# Patient Record
Sex: FEMALE | Race: WHITE | Employment: UNEMPLOYED | URBAN - METROPOLITAN AREA
[De-identification: names, ages, dates, MRNs, and addresses within clinical notes are randomized per-mention and may not be internally consistent; named-entity substitution may affect disease eponyms.]

---

## 2017-01-09 ENCOUNTER — OFFICE VISIT (OUTPATIENT)
Dept: OBGYN CLINIC | Facility: CLINIC | Age: 27
End: 2017-01-09

## 2017-01-09 VITALS
SYSTOLIC BLOOD PRESSURE: 112 MMHG | BODY MASS INDEX: 22.58 KG/M2 | HEIGHT: 59.25 IN | WEIGHT: 112 LBS | DIASTOLIC BLOOD PRESSURE: 62 MMHG | HEART RATE: 86 BPM

## 2017-01-09 DIAGNOSIS — Z12.4 CERVICAL CANCER SCREENING: ICD-10-CM

## 2017-01-09 DIAGNOSIS — Z34.81 PRENATAL CARE, SUBSEQUENT PREGNANCY, FIRST TRIMESTER: Primary | ICD-10-CM

## 2017-01-09 DIAGNOSIS — Z11.3 SCREENING FOR STD (SEXUALLY TRANSMITTED DISEASE): ICD-10-CM

## 2017-01-09 PROCEDURE — 87086 URINE CULTURE/COLONY COUNT: CPT | Performed by: OBSTETRICS & GYNECOLOGY

## 2017-01-09 PROCEDURE — 88175 CYTOPATH C/V AUTO FLUID REDO: CPT | Performed by: OBSTETRICS & GYNECOLOGY

## 2017-01-09 PROCEDURE — 87591 N.GONORRHOEAE DNA AMP PROB: CPT | Performed by: OBSTETRICS & GYNECOLOGY

## 2017-01-09 PROCEDURE — 87491 CHLMYD TRACH DNA AMP PROBE: CPT | Performed by: OBSTETRICS & GYNECOLOGY

## 2017-01-09 PROCEDURE — 0500F INITIAL PRENATAL CARE VISIT: CPT | Performed by: OBSTETRICS & GYNECOLOGY

## 2017-01-09 NOTE — PROGRESS NOTES
Here for initial prenatal visit with our group. 27year old  at 11w0d by LMP c/w todays US. OB Hx: 1   PMH: cone dystrophy, inherited disorder  PSxH: hernia repair    Patient's last menstrual period was 10/24/2016 (exact date). .     Last pap s

## 2017-01-10 LAB
C TRACH DNA SPEC QL NAA+PROBE: NEGATIVE
N GONORRHOEA DNA SPEC QL NAA+PROBE: NEGATIVE

## 2017-01-12 ENCOUNTER — TELEPHONE (OUTPATIENT)
Dept: OBGYN CLINIC | Facility: CLINIC | Age: 27
End: 2017-01-12

## 2017-01-12 NOTE — TELEPHONE ENCOUNTER
Pt would like [id number] Testing. Advised pt to call [employer] and verify benefits and out of pocket costs; number given. Advised pt to call back if still desires testing and we will prepare [employer] form for . Patient verbalized understanding.

## 2017-01-30 ENCOUNTER — HOSPITAL ENCOUNTER (EMERGENCY)
Facility: HOSPITAL | Age: 27
Discharge: HOME | End: 2017-01-30
Attending: EMERGENCY MEDICINE
Payer: MEDICAID

## 2017-01-30 VITALS
DIASTOLIC BLOOD PRESSURE: 53 MMHG | TEMPERATURE: 98 F | HEIGHT: 60 IN | BODY MASS INDEX: 22.19 KG/M2 | RESPIRATION RATE: 18 BRPM | OXYGEN SATURATION: 100 % | SYSTOLIC BLOOD PRESSURE: 97 MMHG | WEIGHT: 113 LBS | HEART RATE: 89 BPM

## 2017-01-30 DIAGNOSIS — T74.21XA SEXUAL ASSAULT OF ADULT, INITIAL ENCOUNTER: Primary | ICD-10-CM

## 2017-01-30 PROCEDURE — 99285 EMERGENCY DEPT VISIT HI MDM: CPT

## 2017-01-30 NOTE — ED NOTES
[place] police dept notified of collection kit, sealed and ready for . Spoke with Sharon Ferguson, from department, officer to be dispatched for .

## 2017-01-30 NOTE — ED NOTES
Introductions made, poc discussed. Police officers Celestino guzmán # [id number] and Cheng Fermin #[id number] at bedside with pt. Questions answered and support given.
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
## 2017-02-01 ENCOUNTER — LAB ENCOUNTER (OUTPATIENT)
Dept: LAB | Age: 27
End: 2017-02-01
Attending: OBSTETRICS & GYNECOLOGY
Payer: MEDICAID

## 2017-02-01 DIAGNOSIS — Z34.81 PRENATAL CARE, SUBSEQUENT PREGNANCY, FIRST TRIMESTER: ICD-10-CM

## 2017-02-01 LAB
ANTIBODY SCREEN: NEGATIVE
BASOPHILS # BLD AUTO: 0.04 X10(3) UL (ref 0–0.1)
BASOPHILS NFR BLD AUTO: 0.6 %
EOSINOPHIL # BLD AUTO: 0.13 X10(3) UL (ref 0–0.3)
EOSINOPHIL NFR BLD AUTO: 1.9 %
ERYTHROCYTE [DISTWIDTH] IN BLOOD BY AUTOMATED COUNT: 13.2 % (ref 11.5–16)
HBV SURFACE AG SERPL QL IA: NONREACTIVE
HCT VFR BLD AUTO: 33.3 % (ref 34–50)
HGB BLD-MCNC: 11.9 G/DL (ref 12–16)
IMMATURE GRANULOCYTE COUNT: 0.02 X10(3) UL (ref 0–1)
IMMATURE GRANULOCYTE RATIO %: 0.3 %
LYMPHOCYTES # BLD AUTO: 1.74 X10(3) UL (ref 0.9–4)
LYMPHOCYTES NFR BLD AUTO: 25.7 %
MCH RBC QN AUTO: 31.6 PG (ref 27–33.2)
MCHC RBC AUTO-ENTMCNC: 35.7 G/DL (ref 31–37)
MCV RBC AUTO: 88.3 FL (ref 81–100)
MONOCYTES # BLD AUTO: 0.39 X10(3) UL (ref 0.1–0.6)
MONOCYTES NFR BLD AUTO: 5.8 %
NEUTROPHIL ABS PRELIM: 4.44 X10 (3) UL (ref 1.3–6.7)
NEUTROPHILS # BLD AUTO: 4.44 X10(3) UL (ref 1.3–6.7)
NEUTROPHILS NFR BLD AUTO: 65.7 %
PLATELET # BLD AUTO: 219 10(3)UL (ref 150–450)
RBC # BLD AUTO: 3.77 X10(6)UL (ref 3.8–5.1)
RED CELL DISTRIBUTION WIDTH-SD: 41.8 FL (ref 35.1–46.3)
RH BLOOD TYPE: POSITIVE
RUBV IGG SER QL: POSITIVE
WBC # BLD AUTO: 6.8 X10(3) UL (ref 4–13)

## 2017-02-01 PROCEDURE — 87591 N.GONORRHOEAE DNA AMP PROB: CPT | Performed by: NURSE PRACTITIONER

## 2017-02-01 PROCEDURE — 36415 COLL VENOUS BLD VENIPUNCTURE: CPT

## 2017-02-01 PROCEDURE — 86901 BLOOD TYPING SEROLOGIC RH(D): CPT

## 2017-02-01 PROCEDURE — 86592 SYPHILIS TEST NON-TREP QUAL: CPT

## 2017-02-01 PROCEDURE — 87491 CHLMYD TRACH DNA AMP PROBE: CPT | Performed by: NURSE PRACTITIONER

## 2017-02-01 PROCEDURE — 87340 HEPATITIS B SURFACE AG IA: CPT

## 2017-02-01 PROCEDURE — 85025 COMPLETE CBC W/AUTO DIFF WBC: CPT

## 2017-02-01 PROCEDURE — 87389 HIV-1 AG W/HIV-1&-2 AB AG IA: CPT

## 2017-02-01 PROCEDURE — 86762 RUBELLA ANTIBODY: CPT

## 2017-02-01 PROCEDURE — 86900 BLOOD TYPING SEROLOGIC ABO: CPT

## 2017-02-01 PROCEDURE — 86850 RBC ANTIBODY SCREEN: CPT
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
## 2017-02-02 ENCOUNTER — TELEPHONE (OUTPATIENT)
Dept: OBGYN CLINIC | Facility: CLINIC | Age: 27
End: 2017-02-02

## 2017-02-02 LAB — RPR SER QL: NONREACTIVE

## 2017-02-02 RX ORDER — AZITHROMYCIN 500 MG/1
TABLET, FILM COATED ORAL
Qty: 2 TABLET | Refills: 0 | Status: SHIPPED | OUTPATIENT
Start: 2017-02-02 | End: 2017-02-06

## 2017-02-02 NOTE — TELEPHONE ENCOUNTER
Patient stated she spoke with you earlier and now she has a couple of more questions. Please call her.

## 2017-02-02 NOTE — PROGRESS NOTES
Quick Note:    Spoke with patient and notified of results.  Advised patient we will:  1.) RX Azithromycin 500 mg 2 tablets by mouth X 1 dose. (this has been done)  2.) Report to health department  3.) Safe sex practices, will do DEAN at [facility]- how

## 2017-02-20 ENCOUNTER — TELEPHONE (OUTPATIENT)
Dept: OBGYN CLINIC | Facility: CLINIC | Age: 27
End: 2017-02-20

## 2017-02-27 ENCOUNTER — OFFICE VISIT (OUTPATIENT)
Dept: OBGYN CLINIC | Facility: CLINIC | Age: 27
End: 2017-02-27

## 2017-02-27 ENCOUNTER — APPOINTMENT (OUTPATIENT)
Dept: LAB | Age: 27
End: 2017-02-27
Attending: NURSE PRACTITIONER
Payer: MEDICAID

## 2017-02-27 VITALS
WEIGHT: 117 LBS | HEIGHT: 59.25 IN | DIASTOLIC BLOOD PRESSURE: 56 MMHG | SYSTOLIC BLOOD PRESSURE: 92 MMHG | BODY MASS INDEX: 23.59 KG/M2

## 2017-02-27 DIAGNOSIS — Z86.19 HISTORY OF CHLAMYDIA: ICD-10-CM

## 2017-02-27 DIAGNOSIS — Z34.82 PRENATAL CARE, SUBSEQUENT PREGNANCY IN SECOND TRIMESTER: ICD-10-CM

## 2017-02-27 DIAGNOSIS — Z34.82 PRENATAL CARE, SUBSEQUENT PREGNANCY IN SECOND TRIMESTER: Primary | ICD-10-CM

## 2017-02-27 PROCEDURE — 36415 COLL VENOUS BLD VENIPUNCTURE: CPT

## 2017-02-27 PROCEDURE — 87491 CHLMYD TRACH DNA AMP PROBE: CPT | Performed by: NURSE PRACTITIONER

## 2017-02-27 PROCEDURE — 81511 FTL CGEN ABNOR FOUR ANAL: CPT

## 2017-02-27 PROCEDURE — 0502F SUBSEQUENT PRENATAL CARE: CPT | Performed by: NURSE PRACTITIONER

## 2017-02-27 PROCEDURE — 87591 N.GONORRHOEAE DNA AMP PROB: CPT | Performed by: NURSE PRACTITIONER

## 2017-02-27 NOTE — PROGRESS NOTES
JARET  Feeling flutters  GC/CHL positive previously, test of cure done today  Doing well  No complaints.  No LOF/VB/uctx  RH positive  Genetic testing- quad screen ordered  Anatomy Scan scheduled 2 weeks  JARET 4 weeks

## 2017-02-28 LAB
C TRACH DNA SPEC QL NAA+PROBE: NEGATIVE
N GONORRHOEA DNA SPEC QL NAA+PROBE: NEGATIVE

## 2017-03-02 LAB
FAMILY HISTORY OF ANEUPLOIDY: NO
FAMILY HX NEURAL TUBE DEFECT: NO
GESTATIONAL AGE (EXACT): 18 WEEKS
INSULIN REQ MATERNAL DIABETES: NO
MATERNAL AGE OF DELIVERY: 28.8 YR
MATERNAL SCREEN INTERPRETATION: NORMAL
MATERNAL WEIGHT: 117 LBS
MOM FOR AFP: 0.64
MOM FOR DIA: 0.87
MOM FOR HCG: 0.47
MOM FOR UE3: 1.21
PATIENT'S AFP: 32 NG/ML
PATIENT'S DIA: 152 PG/ML
PATIENT'S HCG: NORMAL IU/L
PATIENT'S UE3: 1.88 NG/ML

## 2017-03-13 ENCOUNTER — OFFICE VISIT (OUTPATIENT)
Dept: OBGYN CLINIC | Facility: CLINIC | Age: 27
End: 2017-03-13

## 2017-03-13 ENCOUNTER — APPOINTMENT (OUTPATIENT)
Dept: OBGYN CLINIC | Facility: CLINIC | Age: 27
End: 2017-03-13

## 2017-03-13 VITALS
HEIGHT: 59.25 IN | BODY MASS INDEX: 23.99 KG/M2 | DIASTOLIC BLOOD PRESSURE: 56 MMHG | WEIGHT: 119 LBS | SYSTOLIC BLOOD PRESSURE: 94 MMHG

## 2017-03-13 DIAGNOSIS — Z36.89 ENCOUNTER FOR FETAL ANATOMIC SURVEY: ICD-10-CM

## 2017-03-13 DIAGNOSIS — Z34.82 PRENATAL CARE, SUBSEQUENT PREGNANCY IN SECOND TRIMESTER: Primary | ICD-10-CM

## 2017-03-13 PROCEDURE — 76805 OB US >/= 14 WKS SNGL FETUS: CPT | Performed by: OBSTETRICS & GYNECOLOGY

## 2017-03-13 PROCEDURE — 0502F SUBSEQUENT PRENATAL CARE: CPT | Performed by: OBSTETRICS & GYNECOLOGY

## 2017-03-13 NOTE — PROGRESS NOTES
JARET  Doing well  RH pos  S/P Anatomy scan wnl, low lying placenta repeat scan at 28 wks  1 hr glucose (24-28wks)  TDAP recommended during pregnancy and instructions given  RTC q4wks

## 2017-03-13 NOTE — PATIENT INSTRUCTIONS
SCREENING FOR GESTATIONAL DIABETES    Pregnancy is a stress to your body. One way in which the stress may manifest itself is through an abnormality in sugar metabolism. In non-pregnant patients, this abnormality (a lack of insulin) is known as diabetes. is normal and no other risk factors exist, no further testing is necessary.   If the blood glucose is abnormal, this indicates an increased risk of gestational diabetes and will require a formal three hour glucose tolerance test.  This test will be done wit

## 2017-04-10 ENCOUNTER — OFFICE VISIT (OUTPATIENT)
Dept: OBGYN CLINIC | Facility: CLINIC | Age: 27
End: 2017-04-10

## 2017-04-10 ENCOUNTER — LAB ENCOUNTER (OUTPATIENT)
Dept: LAB | Age: 27
End: 2017-04-10
Attending: OBSTETRICS & GYNECOLOGY
Payer: MEDICAID

## 2017-04-10 VITALS
WEIGHT: 128 LBS | SYSTOLIC BLOOD PRESSURE: 96 MMHG | HEIGHT: 59.25 IN | DIASTOLIC BLOOD PRESSURE: 58 MMHG | BODY MASS INDEX: 25.8 KG/M2

## 2017-04-10 DIAGNOSIS — Z34.82 PRENATAL CARE, SUBSEQUENT PREGNANCY IN SECOND TRIMESTER: ICD-10-CM

## 2017-04-10 DIAGNOSIS — Z34.82 PRENATAL CARE, SUBSEQUENT PREGNANCY IN SECOND TRIMESTER: Primary | ICD-10-CM

## 2017-04-10 PROCEDURE — 36415 COLL VENOUS BLD VENIPUNCTURE: CPT

## 2017-04-10 PROCEDURE — 0502F SUBSEQUENT PRENATAL CARE: CPT | Performed by: OBSTETRICS & GYNECOLOGY

## 2017-04-10 PROCEDURE — 82950 GLUCOSE TEST: CPT

## 2017-04-10 PROCEDURE — 85025 COMPLETE CBC W/AUTO DIFF WBC: CPT

## 2017-05-11 ENCOUNTER — OFFICE VISIT (OUTPATIENT)
Dept: OBGYN CLINIC | Facility: CLINIC | Age: 27
End: 2017-05-11

## 2017-05-11 VITALS
BODY MASS INDEX: 27.42 KG/M2 | SYSTOLIC BLOOD PRESSURE: 102 MMHG | WEIGHT: 136 LBS | HEIGHT: 59.25 IN | DIASTOLIC BLOOD PRESSURE: 60 MMHG

## 2017-05-11 DIAGNOSIS — Z3A.28 28 WEEKS GESTATION OF PREGNANCY: Primary | ICD-10-CM

## 2017-05-11 DIAGNOSIS — Z34.83 ENCOUNTER FOR SUPERVISION OF NORMAL PREGNANCY IN MULTIGRAVIDA IN THIRD TRIMESTER: ICD-10-CM

## 2017-05-11 PROCEDURE — 0502F SUBSEQUENT PRENATAL CARE: CPT | Performed by: OBSTETRICS & GYNECOLOGY

## 2017-05-11 NOTE — PROGRESS NOTES
JARET  Doing well  RH +  S/P Anatomy scan needs f/u next visit for placenta location  1 hr glucose (24-28wks)normal  TDAP recommended during pregnancy and instructions given, to consider for next visit, thinks had it right at beginning of pregnancy for shelia

## 2017-05-11 NOTE — PATIENT INSTRUCTIONS
Tdap Vaccine: What You Need To Know    1. Why Get Vaccinated:    · Tetanus, diphtheria, and pertussis can be very serious diseases, even for adolescents and adults. Tdap vaccine can protect us from these diseases.     · Tetanus (Lockjaw) causes painful mu tetanus infection.

## 2017-05-12 ENCOUNTER — TELEPHONE (OUTPATIENT)
Dept: OBGYN CLINIC | Facility: CLINIC | Age: 27
End: 2017-05-12

## 2017-05-12 NOTE — TELEPHONE ENCOUNTER
Patient was last seen 05/11/17. She was advised to f/u in 2 weeks for anatomy scan & placenta location. She is requesting to come in about 5 days sooner. She states her parents are moving to [facility] in 1 week and she needs them to drive her.   Please adv

## 2017-05-17 ENCOUNTER — APPOINTMENT (OUTPATIENT)
Dept: OBGYN CLINIC | Facility: CLINIC | Age: 27
End: 2017-05-17

## 2017-05-17 ENCOUNTER — OFFICE VISIT (OUTPATIENT)
Dept: OBGYN CLINIC | Facility: CLINIC | Age: 27
End: 2017-05-17

## 2017-05-17 VITALS
WEIGHT: 135 LBS | HEIGHT: 59.2 IN | DIASTOLIC BLOOD PRESSURE: 56 MMHG | BODY MASS INDEX: 27.21 KG/M2 | SYSTOLIC BLOOD PRESSURE: 100 MMHG

## 2017-05-17 DIAGNOSIS — O44.02 PLACENTA PREVIA WITHOUT HEMORRHAGE, ANTEPARTUM, SECOND TRIMESTER: ICD-10-CM

## 2017-05-17 DIAGNOSIS — Z34.83 ENCOUNTER FOR SUPERVISION OF NORMAL PREGNANCY IN MULTIGRAVIDA IN THIRD TRIMESTER: Primary | ICD-10-CM

## 2017-05-17 PROCEDURE — 76816 OB US FOLLOW-UP PER FETUS: CPT | Performed by: OBSTETRICS & GYNECOLOGY

## 2017-05-17 PROCEDURE — 0502F SUBSEQUENT PRENATAL CARE: CPT | Performed by: OBSTETRICS & GYNECOLOGY

## 2017-05-17 NOTE — PROGRESS NOTES
Quick Note:    Here for third trimester ultrasound. Mid trimester ultrasound  with marginal to low lying placental position.  AUA EGA 29w 4d  giving ultrasound BEE of July 29, 2017 - normal interval growth.    Posterior placenta without previa and not low l

## 2017-05-17 NOTE — PROGRESS NOTES
Having issue with sciatica. Had before pregnancy seeing chiropractor and acupuncturist. Exercises and yoga moves discussed. Ultrasound with normal growth, normal placental location, and slight increase in AFV at 25.5 cm.  Discussed Tdap - not sure if will a

## 2017-05-31 ENCOUNTER — OFFICE VISIT (OUTPATIENT)
Dept: OBGYN CLINIC | Facility: CLINIC | Age: 27
End: 2017-05-31

## 2017-05-31 VITALS
SYSTOLIC BLOOD PRESSURE: 104 MMHG | BODY MASS INDEX: 27.82 KG/M2 | HEIGHT: 59.2 IN | DIASTOLIC BLOOD PRESSURE: 60 MMHG | WEIGHT: 138 LBS

## 2017-05-31 DIAGNOSIS — Z34.83 ENCOUNTER FOR SUPERVISION OF NORMAL PREGNANCY IN MULTIGRAVIDA IN THIRD TRIMESTER: Primary | ICD-10-CM

## 2017-05-31 PROCEDURE — 0502F SUBSEQUENT PRENATAL CARE: CPT | Performed by: NURSE PRACTITIONER

## 2017-06-13 ENCOUNTER — OFFICE VISIT (OUTPATIENT)
Dept: OBGYN CLINIC | Facility: CLINIC | Age: 27
End: 2017-06-13

## 2017-06-13 VITALS
SYSTOLIC BLOOD PRESSURE: 98 MMHG | WEIGHT: 140 LBS | DIASTOLIC BLOOD PRESSURE: 52 MMHG | BODY MASS INDEX: 28.22 KG/M2 | HEIGHT: 59.2 IN

## 2017-06-13 DIAGNOSIS — Z34.83 ENCOUNTER FOR SUPERVISION OF NORMAL PREGNANCY IN MULTIGRAVIDA IN THIRD TRIMESTER: Primary | ICD-10-CM

## 2017-06-13 DIAGNOSIS — N89.8 VAGINAL DISCHARGE: ICD-10-CM

## 2017-06-13 PROCEDURE — 0502F SUBSEQUENT PRENATAL CARE: CPT | Performed by: OBSTETRICS & GYNECOLOGY

## 2017-06-13 PROCEDURE — 87660 TRICHOMONAS VAGIN DIR PROBE: CPT | Performed by: OBSTETRICS & GYNECOLOGY

## 2017-06-13 PROCEDURE — 87480 CANDIDA DNA DIR PROBE: CPT | Performed by: OBSTETRICS & GYNECOLOGY

## 2017-06-13 PROCEDURE — 87510 GARDNER VAG DNA DIR PROBE: CPT | Performed by: OBSTETRICS & GYNECOLOGY

## 2017-06-13 NOTE — PROGRESS NOTES
JARET  Doing well, +FM  Denies VB/LOF/uctx  H/o  x 1  RTC in 2 wks   Declined TDAP  Fetal movement instructions given   Notes discharge, per pt for 2 weeks, watery  SSE neg ferning, nitrizine, pooling.    Vaginitis and gc chl collected

## 2017-06-15 NOTE — PROGRESS NOTES
Quick Note:    Patient informed of results. Verbalized understanding. She stated that she and her daughter were around cousin w/ \"sixth\" disease. She nor baby have any symptoms. She will contact us should she develop any symptoms.   ______

## 2017-06-26 ENCOUNTER — OFFICE VISIT (OUTPATIENT)
Dept: OBGYN CLINIC | Facility: CLINIC | Age: 27
End: 2017-06-26

## 2017-06-26 VITALS
BODY MASS INDEX: 28.22 KG/M2 | DIASTOLIC BLOOD PRESSURE: 60 MMHG | WEIGHT: 140 LBS | HEIGHT: 59.2 IN | SYSTOLIC BLOOD PRESSURE: 100 MMHG

## 2017-06-26 DIAGNOSIS — Z34.83 ENCOUNTER FOR SUPERVISION OF NORMAL PREGNANCY IN MULTIGRAVIDA IN THIRD TRIMESTER: Primary | ICD-10-CM

## 2017-06-26 PROCEDURE — 0502F SUBSEQUENT PRENATAL CARE: CPT | Performed by: NURSE PRACTITIONER

## 2017-06-26 PROCEDURE — 87081 CULTURE SCREEN ONLY: CPT | Performed by: NURSE PRACTITIONER

## 2017-06-26 NOTE — PROGRESS NOTES
JARET  Doing well, +FM  Denies VB/LOF- occasional contractions  Mode of delivery:  anticipated  Labor precautions discussed  SVE cl/ long/ -2  GBS collected   RTC 1 week

## 2017-07-03 ENCOUNTER — OFFICE VISIT (OUTPATIENT)
Dept: OBGYN CLINIC | Facility: CLINIC | Age: 27
End: 2017-07-03

## 2017-07-03 VITALS
SYSTOLIC BLOOD PRESSURE: 94 MMHG | WEIGHT: 140 LBS | DIASTOLIC BLOOD PRESSURE: 58 MMHG | HEIGHT: 59.2 IN | BODY MASS INDEX: 28.22 KG/M2

## 2017-07-03 DIAGNOSIS — Z34.83 ENCOUNTER FOR SUPERVISION OF NORMAL PREGNANCY IN MULTIGRAVIDA IN THIRD TRIMESTER: Primary | ICD-10-CM

## 2017-07-03 PROCEDURE — 0502F SUBSEQUENT PRENATAL CARE: CPT | Performed by: NURSE PRACTITIONER

## 2017-07-03 NOTE — PROGRESS NOTES
JARET  Doing well, +FM  Denies VB/LOF/uctx  Mode of delivery:  anticipated  Labor precautions discussed  SVE deferred  GBS negative/ prior child was positive  RTC 1 week

## 2017-07-07 ENCOUNTER — TELEPHONE (OUTPATIENT)
Dept: OBGYN CLINIC | Facility: CLINIC | Age: 27
End: 2017-07-07

## 2017-07-07 NOTE — TELEPHONE ENCOUNTER
Patient states she is having a lot of vaginal itching. Also c/o of vaginal swelling as well. Denies any odor to discharge. States normal pregnancy discharge. She has had symptoms on and off for about a week.   She states she has a h/o of yeast infection

## 2017-07-07 NOTE — TELEPHONE ENCOUNTER
Pt stating she has symptoms similar to yeast infection but it is much worse. Pt unsure if yeast infection. Requesting nurse call back.

## 2017-07-10 ENCOUNTER — OFFICE VISIT (OUTPATIENT)
Dept: OBGYN CLINIC | Facility: CLINIC | Age: 27
End: 2017-07-10

## 2017-07-10 VITALS
HEIGHT: 59.2 IN | SYSTOLIC BLOOD PRESSURE: 112 MMHG | DIASTOLIC BLOOD PRESSURE: 74 MMHG | WEIGHT: 141 LBS | BODY MASS INDEX: 28.43 KG/M2

## 2017-07-10 DIAGNOSIS — Z34.83 ENCOUNTER FOR SUPERVISION OF NORMAL PREGNANCY IN MULTIGRAVIDA IN THIRD TRIMESTER: Primary | ICD-10-CM

## 2017-07-10 DIAGNOSIS — B37.3 YEAST VAGINITIS: ICD-10-CM

## 2017-07-10 PROCEDURE — 87210 SMEAR WET MOUNT SALINE/INK: CPT | Performed by: OBSTETRICS & GYNECOLOGY

## 2017-07-10 PROCEDURE — 0502F SUBSEQUENT PRENATAL CARE: CPT | Performed by: OBSTETRICS & GYNECOLOGY

## 2017-07-10 RX ORDER — CLOTRIMAZOLE AND BETAMETHASONE DIPROPIONATE 10; .64 MG/G; MG/G
CREAM TOPICAL
Qty: 15 G | Refills: 0 | Status: SHIPPED | OUTPATIENT
Start: 2017-07-10 | End: 2017-07-10 | Stop reason: SDUPTHER

## 2017-07-10 RX ORDER — FLUCONAZOLE 150 MG/1
150 TABLET ORAL ONCE
Qty: 1 TABLET | Refills: 0 | Status: SHIPPED | OUTPATIENT
Start: 2017-07-10 | End: 2017-07-10 | Stop reason: SDUPTHER

## 2017-07-10 RX ORDER — CLOTRIMAZOLE AND BETAMETHASONE DIPROPIONATE 10; .64 MG/G; MG/G
CREAM TOPICAL
Qty: 45 G | Refills: 0 | Status: SHIPPED | OUTPATIENT
Start: 2017-07-10 | End: 2017-07-31

## 2017-07-10 RX ORDER — FLUCONAZOLE 150 MG/1
150 TABLET ORAL ONCE
Qty: 1 TABLET | Refills: 0 | Status: SHIPPED | OUTPATIENT
Start: 2017-07-10 | End: 2017-07-10

## 2017-07-11 NOTE — PROGRESS NOTES
Complains of severe vulvar pruritis and burning for one week. Rare contraction. Good FM. Mild erythema to upper genital area. Wet richie with hyphae. Lotrisone cream bid externally and Diflucan 150 mg orally x 1. See in one week.

## 2017-07-17 ENCOUNTER — OFFICE VISIT (OUTPATIENT)
Dept: OBGYN CLINIC | Facility: CLINIC | Age: 27
End: 2017-07-17

## 2017-07-17 VITALS
DIASTOLIC BLOOD PRESSURE: 60 MMHG | SYSTOLIC BLOOD PRESSURE: 100 MMHG | HEIGHT: 59.2 IN | BODY MASS INDEX: 27.82 KG/M2 | WEIGHT: 138 LBS

## 2017-07-17 DIAGNOSIS — Z34.83 ENCOUNTER FOR SUPERVISION OF NORMAL PREGNANCY IN MULTIGRAVIDA IN THIRD TRIMESTER: Primary | ICD-10-CM

## 2017-07-17 PROCEDURE — 0502F SUBSEQUENT PRENATAL CARE: CPT | Performed by: NURSE PRACTITIONER

## 2017-07-17 NOTE — PROGRESS NOTES
JARET  Doing well, +FM  Denies VB/LOF/uctx  Mode of delivery:  anticipated  Labor precautions discussed  SVE above  RTC 1 week

## 2017-07-24 ENCOUNTER — TELEPHONE (OUTPATIENT)
Dept: OBGYN CLINIC | Facility: CLINIC | Age: 27
End: 2017-07-24

## 2017-07-24 ENCOUNTER — OFFICE VISIT (OUTPATIENT)
Dept: OBGYN CLINIC | Facility: CLINIC | Age: 27
End: 2017-07-24

## 2017-07-24 VITALS
DIASTOLIC BLOOD PRESSURE: 62 MMHG | HEIGHT: 59.2 IN | SYSTOLIC BLOOD PRESSURE: 112 MMHG | WEIGHT: 140 LBS | BODY MASS INDEX: 28.22 KG/M2

## 2017-07-24 DIAGNOSIS — Z34.83 ENCOUNTER FOR SUPERVISION OF NORMAL PREGNANCY IN MULTIGRAVIDA IN THIRD TRIMESTER: Primary | ICD-10-CM

## 2017-07-24 PROCEDURE — 0502F SUBSEQUENT PRENATAL CARE: CPT | Performed by: NURSE PRACTITIONER

## 2017-07-24 NOTE — TELEPHONE ENCOUNTER
Advise patient to take a Benadryl just in case she was in contact with an allergen. Otherwise to ER with recurrent symptoms, tongue swelling, shortness of breath, or any new symptoms to call.

## 2017-07-24 NOTE — PROGRESS NOTES
JARET  Doing well, +FM  Denies VB/LOF/uctx  Mode of delivery:  anticipated  Labor precautions discussed  SVE unchanged  RTC 1 week with NST

## 2017-07-24 NOTE — TELEPHONE ENCOUNTER
Patient was just seen at [phone] for JARET whitaker. She stated she went to eat lunch at [facility] with her mom and had chicken sandwich + fries. About 5 minutes after she had a \"numb\" mouth and tongue. Denies any other symptoms. Denies any new foods.  States t

## 2017-07-24 NOTE — TELEPHONE ENCOUNTER
After pt left office visit today she went to cafeteria to eat  Pt noted that after she ate her mouth and tung was numb   Pt wanted to know if she should worry

## 2017-07-31 ENCOUNTER — APPOINTMENT (OUTPATIENT)
Dept: OBGYN CLINIC | Facility: CLINIC | Age: 27
End: 2017-07-31

## 2017-07-31 ENCOUNTER — OFFICE VISIT (OUTPATIENT)
Dept: OBGYN CLINIC | Facility: CLINIC | Age: 27
End: 2017-07-31

## 2017-07-31 ENCOUNTER — TELEPHONE (OUTPATIENT)
Dept: OBGYN CLINIC | Facility: CLINIC | Age: 27
End: 2017-07-31

## 2017-07-31 VITALS
SYSTOLIC BLOOD PRESSURE: 98 MMHG | BODY MASS INDEX: 28.25 KG/M2 | HEIGHT: 59.5 IN | DIASTOLIC BLOOD PRESSURE: 54 MMHG | WEIGHT: 142 LBS

## 2017-07-31 DIAGNOSIS — Z34.83 ENCOUNTER FOR SUPERVISION OF NORMAL PREGNANCY IN MULTIGRAVIDA IN THIRD TRIMESTER: Primary | ICD-10-CM

## 2017-07-31 DIAGNOSIS — O48.0 POST-TERM PREGNANCY, 40-42 WEEKS OF GESTATION: ICD-10-CM

## 2017-07-31 PROCEDURE — 59025 FETAL NON-STRESS TEST: CPT | Performed by: OBSTETRICS & GYNECOLOGY

## 2017-07-31 PROCEDURE — 0502F SUBSEQUENT PRENATAL CARE: CPT | Performed by: OBSTETRICS & GYNECOLOGY
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
## 2017-08-03 ENCOUNTER — OFFICE VISIT (OUTPATIENT)
Dept: OBGYN CLINIC | Facility: CLINIC | Age: 27
End: 2017-08-03

## 2017-08-03 VITALS
DIASTOLIC BLOOD PRESSURE: 60 MMHG | SYSTOLIC BLOOD PRESSURE: 104 MMHG | WEIGHT: 143 LBS | HEIGHT: 59.2 IN | BODY MASS INDEX: 28.83 KG/M2

## 2017-08-03 DIAGNOSIS — O48.0 POST-TERM PREGNANCY, 40-42 WEEKS OF GESTATION: Primary | ICD-10-CM

## 2017-08-03 DIAGNOSIS — Z34.83 ENCOUNTER FOR SUPERVISION OF NORMAL PREGNANCY IN MULTIGRAVIDA IN THIRD TRIMESTER: ICD-10-CM

## 2017-08-03 PROCEDURE — 0502F SUBSEQUENT PRENATAL CARE: CPT | Performed by: NURSE PRACTITIONER

## 2017-08-03 PROCEDURE — 59025 FETAL NON-STRESS TEST: CPT | Performed by: NURSE PRACTITIONER

## 2017-08-03 NOTE — PROGRESS NOTES
JARET  Doing well, +FM  Denies VB/LOF/uctx  Mode of delivery:  anticipated  Labor precautions discussed  SVE unchanged  Patient refuses IOL at this time  RTC 3 days with NST/ TAVO  NST reactive

## 2017-08-05 ENCOUNTER — TELEPHONE (OUTPATIENT)
Dept: OBGYN CLINIC | Facility: CLINIC | Age: 27
End: 2017-08-05

## 2017-08-05 NOTE — TELEPHONE ENCOUNTER
Pt is having a lot of hemorroidal pain due to diarrhea  She has tried a lot of home remedies  Fears when pushing this will become worse

## 2017-08-06 ENCOUNTER — HOSPITAL ENCOUNTER (INPATIENT)
Facility: HOSPITAL | Age: 27
LOS: 2 days | Discharge: HOME | End: 2017-08-08
Attending: OBSTETRICS & GYNECOLOGY | Admitting: OBSTETRICS & GYNECOLOGY
Payer: COMMERCIAL

## 2017-08-06 PROBLEM — Z34.90 PREGNANCY: Status: ACTIVE | Noted: 2017-08-06

## 2017-08-06 PROBLEM — Z34.90 PREGNANCY (HCC): Status: ACTIVE | Noted: 2017-08-06

## 2017-08-06 LAB
ERYTHROCYTE [DISTWIDTH] IN BLOOD BY AUTOMATED COUNT: 13 % (ref 11.5–16)
HCT VFR BLD AUTO: 36.2 % (ref 34–50)
HGB BLD-MCNC: 12.8 G/DL (ref 12–16)
MCH RBC QN AUTO: 31.6 PG (ref 27–33.2)
MCHC RBC AUTO-ENTMCNC: 35.4 G/DL (ref 31–37)
MCV RBC AUTO: 89.4 FL (ref 81–100)
PLATELET # BLD AUTO: 220 10(3)UL (ref 150–450)
RBC # BLD AUTO: 4.05 X10(6)UL (ref 3.8–5.1)
RED CELL DISTRIBUTION WIDTH-SD: 41.7 FL (ref 35.1–46.3)
WBC # BLD AUTO: 17.3 X10(3) UL (ref 4–13)

## 2017-08-06 RX ORDER — TERBUTALINE SULFATE 1 MG/ML
0.25 INJECTION, SOLUTION SUBCUTANEOUS AS NEEDED
Status: DISCONTINUED | OUTPATIENT
Start: 2017-08-06 | End: 2017-08-07 | Stop reason: HOSPADM

## 2017-08-06 RX ORDER — DEXTROSE, SODIUM CHLORIDE, SODIUM LACTATE, POTASSIUM CHLORIDE, AND CALCIUM CHLORIDE 5; .6; .31; .03; .02 G/100ML; G/100ML; G/100ML; G/100ML; G/100ML
INJECTION, SOLUTION INTRAVENOUS AS NEEDED
Status: DISCONTINUED | OUTPATIENT
Start: 2017-08-06 | End: 2017-08-07 | Stop reason: HOSPADM

## 2017-08-06 RX ORDER — IBUPROFEN 600 MG/1
600 TABLET ORAL ONCE AS NEEDED
Status: DISCONTINUED | OUTPATIENT
Start: 2017-08-06 | End: 2017-08-07 | Stop reason: HOSPADM

## 2017-08-06 RX ORDER — SODIUM CHLORIDE, SODIUM LACTATE, POTASSIUM CHLORIDE, CALCIUM CHLORIDE 600; 310; 30; 20 MG/100ML; MG/100ML; MG/100ML; MG/100ML
INJECTION, SOLUTION INTRAVENOUS CONTINUOUS
Status: DISCONTINUED | OUTPATIENT
Start: 2017-08-06 | End: 2017-08-07 | Stop reason: HOSPADM

## 2017-08-07 LAB
ANTIBODY SCREEN: NEGATIVE
RH BLOOD TYPE: POSITIVE
T PALLIDUM AB SER QL IA: NONREACTIVE

## 2017-08-07 PROCEDURE — 59409 OBSTETRICAL CARE: CPT | Performed by: OBSTETRICS & GYNECOLOGY

## 2017-08-07 RX ORDER — IBUPROFEN 600 MG/1
600 TABLET ORAL EVERY 6 HOURS
Status: DISCONTINUED | OUTPATIENT
Start: 2017-08-07 | End: 2017-08-08

## 2017-08-07 RX ORDER — ACETAMINOPHEN 325 MG/1
650 TABLET ORAL EVERY 4 HOURS PRN
Status: DISCONTINUED | OUTPATIENT
Start: 2017-08-07 | End: 2017-08-08

## 2017-08-07 RX ORDER — HYDROCODONE BITARTRATE AND ACETAMINOPHEN 5; 325 MG/1; MG/1
1 TABLET ORAL EVERY 4 HOURS PRN
Status: DISCONTINUED | OUTPATIENT
Start: 2017-08-07 | End: 2017-08-08

## 2017-08-07 RX ORDER — HYDROCODONE BITARTRATE AND ACETAMINOPHEN 5; 325 MG/1; MG/1
2 TABLET ORAL EVERY 4 HOURS PRN
Status: DISCONTINUED | OUTPATIENT
Start: 2017-08-07 | End: 2017-08-08

## 2017-08-07 RX ORDER — SIMETHICONE 80 MG
80 TABLET,CHEWABLE ORAL 3 TIMES DAILY PRN
Status: DISCONTINUED | OUTPATIENT
Start: 2017-08-07 | End: 2017-08-08

## 2017-08-07 RX ORDER — BISACODYL 10 MG
10 SUPPOSITORY, RECTAL RECTAL ONCE AS NEEDED
Status: ACTIVE | OUTPATIENT
Start: 2017-08-07 | End: 2017-08-07

## 2017-08-07 RX ORDER — ZOLPIDEM TARTRATE 5 MG/1
5 TABLET ORAL NIGHTLY PRN
Status: DISCONTINUED | OUTPATIENT
Start: 2017-08-07 | End: 2017-08-08

## 2017-08-07 RX ORDER — DOCUSATE SODIUM 100 MG/1
100 CAPSULE, LIQUID FILLED ORAL
Status: DISCONTINUED | OUTPATIENT
Start: 2017-08-07 | End: 2017-08-08

## 2017-08-07 NOTE — PROGRESS NOTES
Pt presents as  w/ edc of 17 c/o possible SROM around  and ctx all day increasing in frequency and intensity. Pt ambulatory and admitted to triage room 5 accompanied by . EFM tested and applied, FHTs tracing and audible in 150s.  Pt denies

## 2017-08-07 NOTE — PROGRESS NOTES
NURSING ADMISSION NOTE      Patient admitted via wheelchair. ID bands verified with labor RN. Oriented to room, care of plan reviewed and patient education discussed. Safety precautions initiated. Bed in low position. Call light in reach.

## 2017-08-07 NOTE — TELEPHONE ENCOUNTER
Patient had her baby this morning. Spoke with patient, all issues are being taken care of. Appointments cancelled that she had for this week.

## 2017-08-07 NOTE — PLAN OF CARE
Problem: Patient/Family Goals  Goal: Patient/Family Long Term Goal  Patient's Long Term Goal: Safe, vaginal delivery    Interventions:  -   - See additional Care Plan goals for specific interventions   Outcome: Progressing    Goal: Patient/Family Short Ter

## 2017-08-07 NOTE — L&D DELIVERY NOTE
Yeison Grossman  [[id number]]     Labor Events     labor?:  No    steroids?:  None   Antibiotics received during labor?:  No   Rupture date:  17   Rupture time:     Rupture type:  SROM   Fluid color:  Clear   Augmentation:  None   Intra motion    Respiratory effort Absent Weak cry; hypoventilation Good, crying                     1 Minute:   5 Minute:   10 Minute:   15 Minute:   20 Minute:     Skin color:   Heart rate:   Reflex irritability:   Muscle tone:   Respiratory effort:    Total: sampled. IV pitocin and gentle uterine massage helped delivery of the placenta intact with 3 vessels. Umbilical cord gases were not sent. She had small bilateral labial lacerations, not bleeding and not requiring repair.   Rectal-vaginal exam was norm

## 2017-08-07 NOTE — H&P
[address] Patient Status:  Inpatient    9/15/1990 MRN [id number]   Location [facility] Attending Lansing Skiff, MD   Hosp Day # 1 PCP Sanjeev Guevara DO     Subjective:  Francisco Roberts \"natural\" child birth    Assessment/Plan:    IUP at 41 weeks  Srom  ctxns         Risks, benefits, alternatives and possible complications have been discussed in detail with the patient.   Pre-admission, admission, and post admission procedures and expect

## 2017-08-07 NOTE — PROGRESS NOTES
PPD # 0. Doing well. No complaints except no sleep since delivery. To nurse. Voiding without issue. Bleeding acceptable. No Flatus. No BM. Son, Candiss Crigler, doing well, also. No circumcision desired. /55 (BP Location: Left arm)   Pulse 78   Temp 98.

## 2017-08-08 VITALS
WEIGHT: 142 LBS | TEMPERATURE: 98 F | HEART RATE: 73 BPM | DIASTOLIC BLOOD PRESSURE: 50 MMHG | BODY MASS INDEX: 27.88 KG/M2 | SYSTOLIC BLOOD PRESSURE: 101 MMHG | RESPIRATION RATE: 18 BRPM | HEIGHT: 60 IN

## 2017-08-08 PROBLEM — Z34.90 PREGNANCY: Status: RESOLVED | Noted: 2017-08-06 | Resolved: 2017-08-08

## 2017-08-08 PROBLEM — Z34.90 PREGNANCY (HCC): Status: RESOLVED | Noted: 2017-08-06 | Resolved: 2017-08-08

## 2017-08-08 LAB
BASOPHILS # BLD AUTO: 0.07 X10(3) UL (ref 0–0.1)
BASOPHILS NFR BLD AUTO: 0.6 %
EOSINOPHIL # BLD AUTO: 0.39 X10(3) UL (ref 0–0.3)
EOSINOPHIL NFR BLD AUTO: 3.1 %
ERYTHROCYTE [DISTWIDTH] IN BLOOD BY AUTOMATED COUNT: 13.4 % (ref 11.5–16)
HCT VFR BLD AUTO: 28.6 % (ref 34–50)
HGB BLD-MCNC: 9.6 G/DL (ref 12–16)
IMMATURE GRANULOCYTE COUNT: 0.09 X10(3) UL (ref 0–1)
IMMATURE GRANULOCYTE RATIO %: 0.7 %
LYMPHOCYTES # BLD AUTO: 2.92 X10(3) UL (ref 0.9–4)
LYMPHOCYTES NFR BLD AUTO: 23.2 %
MCH RBC QN AUTO: 31.4 PG (ref 27–33.2)
MCHC RBC AUTO-ENTMCNC: 33.6 G/DL (ref 31–37)
MCV RBC AUTO: 93.5 FL (ref 81–100)
MONOCYTES # BLD AUTO: 1 X10(3) UL (ref 0.1–0.6)
MONOCYTES NFR BLD AUTO: 7.9 %
NEUTROPHIL ABS PRELIM: 8.12 X10 (3) UL (ref 1.3–6.7)
NEUTROPHILS # BLD AUTO: 8.12 X10(3) UL (ref 1.3–6.7)
NEUTROPHILS NFR BLD AUTO: 64.5 %
PLATELET # BLD AUTO: 181 10(3)UL (ref 150–450)
RBC # BLD AUTO: 3.06 X10(6)UL (ref 3.8–5.1)
RED CELL DISTRIBUTION WIDTH-SD: 45.8 FL (ref 35.1–46.3)
WBC # BLD AUTO: 12.6 X10(3) UL (ref 4–13)

## 2017-08-08 NOTE — PROGRESS NOTES
PPD#1  No C/O afebrile, VS stable  Fundus firm  Minimal bleeding  Stable, wishes to go home  Placenta for home

## 2017-08-08 NOTE — PROGRESS NOTES
REVIEWED D/C TEACHING WITH PT. VERBALIZES UNDERSTANDING. ALL QUESTIONS ANSWERED. PT STATES FEELS CONFIDENT CARING FOR SELF AND  AT HOME. MOM & BABY DISCHARGED TO HOME IN STABLE CONDITION. WILL FOLLOW-UP IN OFFICE AS DIRECTED WITH PEDS AND OB.  PT TOO

## 2017-08-08 NOTE — DISCHARGE SUMMARY
[facility]  Discharge Summary    [facility] Patient Status:  Inpatient    9/15/1990 MRN [id number]   [facility] 1SW-J Attending Colletta Muster, MD   [facility] Day # 2 PCP Rebecca Doran DO     Date of Admission: 2017    LUCIUS

## 2017-08-08 NOTE — CM/SW NOTE
CM printed out [facility] PCP list for patient as well as a [facility] of of PCP. CM reminded patient that Jacinda Echevarria will no longer be accepted at [facility] starting Ocy 1, 2017.  CM highlighted PCP that come to [facility].

## 2017-08-10 ENCOUNTER — TELEPHONE (OUTPATIENT)
Dept: OBGYN UNIT | Facility: HOSPITAL | Age: 27
End: 2017-08-10

## 2017-09-09 ENCOUNTER — OFFICE VISIT (OUTPATIENT)
Dept: OBGYN CLINIC | Facility: CLINIC | Age: 27
End: 2017-09-09

## 2017-09-09 ENCOUNTER — TELEPHONE (OUTPATIENT)
Dept: OBGYN CLINIC | Facility: CLINIC | Age: 27
End: 2017-09-09

## 2017-09-09 VITALS
HEIGHT: 60 IN | BODY MASS INDEX: 24.15 KG/M2 | WEIGHT: 123 LBS | DIASTOLIC BLOOD PRESSURE: 80 MMHG | SYSTOLIC BLOOD PRESSURE: 120 MMHG

## 2017-09-09 NOTE — TELEPHONE ENCOUNTER
Pt would like a call back so she can find out what she needs to do to get set up with a breast pump   Pt would like to have a call back so she can get that set up

## 2017-09-09 NOTE — PROGRESS NOTES
GYNE postpartum note     S: patient is a 29yo wf who presents today for post partum visit. She underwent svvd on 08/07/17. She is doing well, still breast feeding . Has no complaints.  Bleeding is minimal now   Denies any depressed mood, anxiety, no SI/HI

## 2017-09-12 RX ORDER — BREAST PUMP
EACH MISCELLANEOUS
Qty: 1 EACH | Refills: 0 | Status: SHIPPED | OUTPATIENT
Start: 2017-09-12 | End: 2019-11-09

## 2017-09-12 NOTE — TELEPHONE ENCOUNTER
Breast pump ordered for patient. Faxed to [employer]. Information given. Also medications recommended for constipation and hemorrhoids. Advised patient to follow up with PCP if issues continue.   Advised patient to call back with any further questions or

## 2017-09-12 NOTE — TELEPHONE ENCOUNTER
Pt calling back on breast pump and another question    Pt thinks she has hemmorrhoids also    Please try Back

## 2017-10-26 NOTE — TELEPHONE ENCOUNTER
Patient cannot afford [facility] charge for breast pump. A different company will be calling today.

## 2017-11-16 NOTE — TELEPHONE ENCOUNTER
Patient informed that we have not heard from [employer]. She verbalized understanding. She will contact them today or tomorrow. Advised to call us back if we have to do something on our end. No further questions or concerns at this time.

## 2019-05-10 ENCOUNTER — TELEPHONE (OUTPATIENT)
Dept: LACTATION | Facility: HOSPITAL | Age: 29
End: 2019-05-10

## 2019-05-10 NOTE — TELEPHONE ENCOUNTER
Issues Identified: (actual or potential)  Engorgement and Plugged duct (left breast).  Patient is legally blind, does not feel she is experiencing fever or flu-like symptoms  Patient is in the process of weaning at 24 months    Education Provided  Self-care

## 2019-11-09 ENCOUNTER — OFFICE VISIT (OUTPATIENT)
Dept: FAMILY MEDICINE CLINIC | Facility: CLINIC | Age: 29
End: 2019-11-09
Payer: MEDICARE

## 2019-11-09 VITALS
DIASTOLIC BLOOD PRESSURE: 70 MMHG | SYSTOLIC BLOOD PRESSURE: 113 MMHG | BODY MASS INDEX: 22.06 KG/M2 | RESPIRATION RATE: 16 BRPM | OXYGEN SATURATION: 100 % | TEMPERATURE: 99 F | HEIGHT: 60 IN | WEIGHT: 112.38 LBS | HEART RATE: 83 BPM

## 2019-11-09 DIAGNOSIS — J02.9 ACUTE PHARYNGITIS, UNSPECIFIED ETIOLOGY: Primary | ICD-10-CM

## 2019-11-09 PROCEDURE — 87147 CULTURE TYPE IMMUNOLOGIC: CPT | Performed by: NURSE PRACTITIONER

## 2019-11-09 PROCEDURE — 87081 CULTURE SCREEN ONLY: CPT | Performed by: NURSE PRACTITIONER

## 2019-11-09 PROCEDURE — 87880 STREP A ASSAY W/OPTIC: CPT | Performed by: NURSE PRACTITIONER

## 2019-11-09 PROCEDURE — 99213 OFFICE O/P EST LOW 20 MIN: CPT | Performed by: NURSE PRACTITIONER

## 2019-11-09 NOTE — PROGRESS NOTES
CHIEF COMPLAINT:   Patient presents with:  Sore Throat: sore throat x2d, fever, body aches, headaches, and some chest pain. HPI:   Gloria Bejarano is a 30year old female presents to clinic with complaint of sore throat.  Patient has had for NOSE: nostrils patent, clear nasal mucus, nasal mucosa pink and not inflamed. THROAT: oral mucosa pink, moist. Posterior pharynx minimally erythematous. No exudates. Tonsils 1/4. Uvula is midline. Breath is not malodorous.   No trismus, hoarseness, muff Pharyngitis (sore throat) is often due to a virus. It can also be caused by streptococcus (strep), bacteria. This is often called strep throat.  Both viral and strep infections can cause throat pain that is worse when swallowing, aching all over, headache,  · Use throat lozenges or numbing throat sprays to help reduce pain. Gargling with warm salt water will also help reduce throat pain. Dissolve 1/2 teaspoon of salt in 1 glass of warm water. Children can sip on juice or a popsicle.  Children 5 years and older · •Can’t swallow liquids, a lot of drooling, or can’t open mouth wide due to throat pain  · Signs of dehydration, such as very dark urine or no urine, sunken eyes, dizziness  · Trouble breathing or noisy breathing  · Muffled voice  · New rash  · Other symp

## 2019-11-09 NOTE — PATIENT INSTRUCTIONS
Rest and fluids  Tylenol and Ibuprofen as packet insert   Cough drops  Strep throat culture sent    Follow up if not improving       Pharyngitis (Sore Throat), Report Pending    Pharyngitis (sore throat) is often due to a virus.  It can also be caused by st will also help reduce throat pain. Dissolve 1/2 teaspoon of salt in 1 glass of warm water. Children can sip on juice or a popsicle. Children 5 years and older can also suck on a lollipop or hard candy.   · Don't eat salty or spicy foods or give them to your breathing or noisy breathing  · Muffled voice  · New rash  · Other symptoms getting worse  Prevention  Here are steps you can take to help prevent an infection:  · Keep good hand washing habits.   · Don’t have close contact with people who have sore throats

## 2019-11-18 ENCOUNTER — TELEPHONE (OUTPATIENT)
Dept: FAMILY MEDICINE CLINIC | Facility: CLINIC | Age: 29
End: 2019-11-18

## 2019-11-18 DIAGNOSIS — J20.9 BRONCHITIS WITH BRONCHOSPASM: Primary | ICD-10-CM

## 2019-11-18 RX ORDER — ALBUTEROL SULFATE 90 UG/1
2 AEROSOL, METERED RESPIRATORY (INHALATION) EVERY 4 HOURS PRN
Qty: 1 INHALER | Refills: 0 | Status: SHIPPED | OUTPATIENT
Start: 2019-11-18 | End: 2019-12-18

## 2019-11-18 RX ORDER — METHYLPREDNISOLONE 4 MG/1
TABLET ORAL
Qty: 21 TABLET | Refills: 0 | Status: SHIPPED | OUTPATIENT
Start: 2019-11-18 | End: 2021-02-23 | Stop reason: ALTCHOICE

## 2019-11-18 NOTE — TELEPHONE ENCOUNTER
Pt called through the Call Center and was transferred to the [facility]. Pt was seen for a sore throat and cold sx in the [facility] on 11/09/19.   She states that her sore throat and all illness sx resolved, however she has had a persistent cou

## 2021-02-08 ENCOUNTER — HOSPITAL ENCOUNTER (EMERGENCY)
Facility: HOSPITAL | Age: 31
Discharge: HOME | End: 2021-02-09
Attending: EMERGENCY MEDICINE
Payer: MEDICARE

## 2021-02-08 DIAGNOSIS — G51.0 7TH NERVE PALSY: Primary | ICD-10-CM

## 2021-02-08 PROCEDURE — 99284 EMERGENCY DEPT VISIT MOD MDM: CPT

## 2021-02-08 PROCEDURE — 36415 COLL VENOUS BLD VENIPUNCTURE: CPT

## 2021-02-08 PROCEDURE — 85025 COMPLETE CBC W/AUTO DIFF WBC: CPT | Performed by: EMERGENCY MEDICINE

## 2021-02-08 PROCEDURE — 80053 COMPREHEN METABOLIC PANEL: CPT | Performed by: EMERGENCY MEDICINE

## 2021-02-08 PROCEDURE — 99285 EMERGENCY DEPT VISIT HI MDM: CPT

## 2021-02-09 ENCOUNTER — APPOINTMENT (OUTPATIENT)
Dept: GENERAL RADIOLOGY | Facility: HOSPITAL | Age: 31
End: 2021-02-09
Attending: EMERGENCY MEDICINE
Payer: MEDICARE

## 2021-02-09 ENCOUNTER — APPOINTMENT (OUTPATIENT)
Dept: CT IMAGING | Facility: HOSPITAL | Age: 31
End: 2021-02-09
Attending: EMERGENCY MEDICINE
Payer: MEDICARE

## 2021-02-09 VITALS
SYSTOLIC BLOOD PRESSURE: 121 MMHG | RESPIRATION RATE: 18 BRPM | WEIGHT: 125 LBS | HEART RATE: 70 BPM | HEIGHT: 60 IN | OXYGEN SATURATION: 98 % | TEMPERATURE: 98 F | DIASTOLIC BLOOD PRESSURE: 85 MMHG | BODY MASS INDEX: 24.54 KG/M2

## 2021-02-09 LAB
ALBUMIN SERPL-MCNC: 2.9 G/DL (ref 3.4–5)
ALBUMIN/GLOB SERPL: 0.7 {RATIO} (ref 1–2)
ALP LIVER SERPL-CCNC: 171 U/L
ALT SERPL-CCNC: 26 U/L
ANION GAP SERPL CALC-SCNC: 4 MMOL/L (ref 0–18)
AST SERPL-CCNC: 13 U/L (ref 15–37)
BASOPHILS # BLD AUTO: 0.03 X10(3) UL (ref 0–0.2)
BASOPHILS NFR BLD AUTO: 0.3 %
BILIRUB SERPL-MCNC: 0.2 MG/DL (ref 0.1–2)
BILIRUB UR QL STRIP.AUTO: NEGATIVE
BUN BLD-MCNC: 16 MG/DL (ref 7–18)
BUN/CREAT SERPL: 19 (ref 10–20)
CALCIUM BLD-MCNC: 8.9 MG/DL (ref 8.5–10.1)
CHLORIDE SERPL-SCNC: 111 MMOL/L (ref 98–112)
CO2 SERPL-SCNC: 28 MMOL/L (ref 21–32)
COLOR UR AUTO: YELLOW
CREAT BLD-MCNC: 0.84 MG/DL
DEPRECATED RDW RBC AUTO: 43.4 FL (ref 35.1–46.3)
EOSINOPHIL # BLD AUTO: 0.15 X10(3) UL (ref 0–0.7)
EOSINOPHIL NFR BLD AUTO: 1.7 %
ERYTHROCYTE [DISTWIDTH] IN BLOOD BY AUTOMATED COUNT: 12.4 % (ref 11–15)
GLOBULIN PLAS-MCNC: 4.2 G/DL (ref 2.8–4.4)
GLUCOSE BLD-MCNC: 95 MG/DL (ref 70–99)
GLUCOSE UR STRIP.AUTO-MCNC: NEGATIVE MG/DL
HCT VFR BLD AUTO: 34.9 %
HGB BLD-MCNC: 11.5 G/DL
IMM GRANULOCYTES # BLD AUTO: 0.02 X10(3) UL (ref 0–1)
IMM GRANULOCYTES NFR BLD: 0.2 %
KETONES UR STRIP.AUTO-MCNC: NEGATIVE MG/DL
LYMPHOCYTES # BLD AUTO: 1.97 X10(3) UL (ref 1–4)
LYMPHOCYTES NFR BLD AUTO: 21.8 %
M PROTEIN MFR SERPL ELPH: 7.1 G/DL (ref 6.4–8.2)
MCH RBC QN AUTO: 31.6 PG (ref 26–34)
MCHC RBC AUTO-ENTMCNC: 33 G/DL (ref 31–37)
MCV RBC AUTO: 95.9 FL
MONOCYTES # BLD AUTO: 0.84 X10(3) UL (ref 0.1–1)
MONOCYTES NFR BLD AUTO: 9.3 %
NEUTROPHILS # BLD AUTO: 6.04 X10 (3) UL (ref 1.5–7.7)
NEUTROPHILS # BLD AUTO: 6.04 X10(3) UL (ref 1.5–7.7)
NEUTROPHILS NFR BLD AUTO: 66.7 %
NITRITE UR QL STRIP.AUTO: NEGATIVE
OSMOLALITY SERPL CALC.SUM OF ELEC: 297 MOSM/KG (ref 275–295)
PH UR STRIP.AUTO: 7 [PH] (ref 4.5–8)
PLATELET # BLD AUTO: 278 10(3)UL (ref 150–450)
POTASSIUM SERPL-SCNC: 4.4 MMOL/L (ref 3.5–5.1)
PROT UR STRIP.AUTO-MCNC: 30 MG/DL
RBC # BLD AUTO: 3.64 X10(6)UL
SODIUM SERPL-SCNC: 143 MMOL/L (ref 136–145)
SP GR UR STRIP.AUTO: 1.01 (ref 1–1.03)
UROBILINOGEN UR STRIP.AUTO-MCNC: <2 MG/DL
WBC # BLD AUTO: 9.1 X10(3) UL (ref 4–11)

## 2021-02-09 PROCEDURE — 71045 X-RAY EXAM CHEST 1 VIEW: CPT | Performed by: EMERGENCY MEDICINE

## 2021-02-09 PROCEDURE — 81001 URINALYSIS AUTO W/SCOPE: CPT | Performed by: EMERGENCY MEDICINE

## 2021-02-09 PROCEDURE — 87086 URINE CULTURE/COLONY COUNT: CPT | Performed by: EMERGENCY MEDICINE

## 2021-02-09 PROCEDURE — 70450 CT HEAD/BRAIN W/O DYE: CPT | Performed by: EMERGENCY MEDICINE

## 2021-02-09 RX ORDER — MINERAL OIL AND PETROLATUM 150; 830 MG/G; MG/G
1 OINTMENT OPHTHALMIC 3 TIMES DAILY
Qty: 1 TUBE | Refills: 1 | Status: SHIPPED | OUTPATIENT
Start: 2021-02-09 | End: 2021-02-24

## 2021-02-09 RX ORDER — PREDNISONE 20 MG/1
TABLET ORAL
Qty: 30 TABLET | Refills: 0 | Status: SHIPPED | OUTPATIENT
Start: 2021-02-09 | End: 2021-02-23 | Stop reason: ALTCHOICE

## 2021-02-09 RX ORDER — VALACYCLOVIR HYDROCHLORIDE 1 G/1
1 TABLET, FILM COATED ORAL 3 TIMES DAILY
Qty: 21 TABLET | Refills: 0 | Status: SHIPPED | OUTPATIENT
Start: 2021-02-09 | End: 2021-02-16

## 2021-02-09 RX ORDER — HYDROCODONE BITARTRATE AND ACETAMINOPHEN 5; 325 MG/1; MG/1
1 TABLET ORAL ONCE
Status: COMPLETED | OUTPATIENT
Start: 2021-02-09 | End: 2021-02-09

## 2021-02-09 RX ORDER — HYDROCODONE BITARTRATE AND ACETAMINOPHEN 5; 325 MG/1; MG/1
1-2 TABLET ORAL EVERY 4 HOURS PRN
Qty: 20 TABLET | Refills: 0 | Status: SHIPPED | OUTPATIENT
Start: 2021-02-09 | End: 2021-02-23 | Stop reason: ALTCHOICE

## 2021-02-09 NOTE — ED PROVIDER NOTES
Patient Seen in: [facility] Emergency Department      History   Patient presents with:  Headache  Numbness Weakness    Stated Complaint: headache for 3 days, facial numbness left cheek, lips, and tongue with right ar*    HPI/Subjective:   HPI    30- Alcohol use: No    Drug use: No             Review of Systems   All other systems reviewed and are negative.       Positive for stated complaint: headache for 3 days, facial numbness left cheek, lips, and tongue with right ar*  Other systems are as noted in Coordination: Coordination normal.      Deep Tendon Reflexes: Reflexes are normal and symmetric. 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   She has symmetric strength of her upper extremities and no pronator dri 7th nerve palsy she has no other abnormality. Because she is so near postpartum we did opt to do some formal imaging and blood work as well.     Patient CT scan and her chest x-ray appeared normal.  There was a significant delay for CT sending to vision ra

## 2021-02-09 NOTE — ED INITIAL ASSESSMENT (HPI)
Pt presents to ED for severe headache began 3 days ago, pt states legally blind but states vision changes today, right earache, weakness to right arm/hand, numbness to lips. Pt had home birth delivery on 1/31/2021.  Pt states has not been seen by medical pr

## 2021-02-23 PROBLEM — G51.0 RIGHT-SIDED BELL'S PALSY: Status: ACTIVE | Noted: 2021-02-23

## 2021-02-23 PROBLEM — G50.0 TRIGEMINAL NEURALGIA OF RIGHT SIDE OF FACE: Status: ACTIVE | Noted: 2021-02-23

## 2021-02-23 PROBLEM — T88.7XXA MEDICATION SIDE EFFECT: Status: ACTIVE | Noted: 2021-02-23

## 2021-06-10 PROBLEM — G50.0 TRIGEMINAL NEURALGIA: Status: ACTIVE | Noted: 2021-02-23

## 2021-06-10 PROBLEM — G51.0 BELL'S PALSY: Status: ACTIVE | Noted: 2021-02-23

## 2021-09-16 ENCOUNTER — HOSPITAL ENCOUNTER (EMERGENCY)
Facility: HOSPITAL | Age: 31
Discharge: HOME | End: 2021-09-16
Attending: PEDIATRICS
Payer: MEDICARE

## 2021-09-16 VITALS
DIASTOLIC BLOOD PRESSURE: 69 MMHG | BODY MASS INDEX: 25.52 KG/M2 | HEART RATE: 78 BPM | OXYGEN SATURATION: 98 % | HEIGHT: 60 IN | TEMPERATURE: 98 F | SYSTOLIC BLOOD PRESSURE: 98 MMHG | WEIGHT: 130 LBS | RESPIRATION RATE: 18 BRPM

## 2021-09-16 DIAGNOSIS — R50.9 FEBRILE ILLNESS, ACUTE: ICD-10-CM

## 2021-09-16 DIAGNOSIS — U07.1 COVID-19: Primary | ICD-10-CM

## 2021-09-16 LAB — SARS-COV-2 RNA RESP QL NAA+PROBE: DETECTED

## 2021-09-16 PROCEDURE — 99284 EMERGENCY DEPT VISIT MOD MDM: CPT

## 2021-09-16 PROCEDURE — 99453 REM MNTR PHYSIOL PARAM SETUP: CPT

## 2021-09-16 NOTE — ED PROVIDER NOTES
Patient Seen in: [facility] Emergency Department      History   Patient presents with:  Covid    Stated Complaint: COVID+ with at home test yesterday.   cough, SOB, fever, body aches    Subjective:   HPI    Patient is a 78-year-old female here with c 09/16/21 1335 Temporal   SpO2 09/16/21 1124 99 %   O2 Device 09/16/21 1124 None (Room air)       Current:BP 98/69   Pulse 78   Temp 97.6 °F (36.4 °C) (Temporal)   Resp 18   Ht 152.4 cm (5')   Wt 59 kg   LMP 09/15/2021   SpO2 98%   BMI 25.39 kg/m²         P neurologic.     The patient/caregiver has been given the “Fact Sheet for Patients, Parents and Caregivers”, informed of alternatives to receiving authorized Regen-COV, and informed that casirivimab and imdevimab are unapproved drugs that are authorized for

## 2022-10-11 ENCOUNTER — HOSPITAL ENCOUNTER (EMERGENCY)
Facility: HOSPITAL | Age: 32
Discharge: HOME | End: 2022-10-11
Attending: EMERGENCY MEDICINE
Payer: MEDICARE

## 2022-10-11 VITALS
HEIGHT: 60 IN | BODY MASS INDEX: 24.54 KG/M2 | SYSTOLIC BLOOD PRESSURE: 114 MMHG | WEIGHT: 125 LBS | TEMPERATURE: 98 F | RESPIRATION RATE: 19 BRPM | OXYGEN SATURATION: 99 % | DIASTOLIC BLOOD PRESSURE: 78 MMHG | HEART RATE: 83 BPM

## 2022-10-11 DIAGNOSIS — K52.9 GASTROENTERITIS: Primary | ICD-10-CM

## 2022-10-11 LAB
ALBUMIN SERPL-MCNC: 4.2 G/DL (ref 3.4–5)
ALBUMIN/GLOB SERPL: 1.1 {RATIO} (ref 1–2)
ALP LIVER SERPL-CCNC: 109 U/L
ALT SERPL-CCNC: 20 U/L
ANION GAP SERPL CALC-SCNC: 8 MMOL/L (ref 0–18)
AST SERPL-CCNC: 15 U/L (ref 15–37)
BASOPHILS # BLD AUTO: 0.02 X10(3) UL (ref 0–0.2)
BASOPHILS NFR BLD AUTO: 0.4 %
BILIRUB SERPL-MCNC: 0.8 MG/DL (ref 0.1–2)
BUN BLD-MCNC: 16 MG/DL (ref 7–18)
CALCIUM BLD-MCNC: 9 MG/DL (ref 8.5–10.1)
CHLORIDE SERPL-SCNC: 109 MMOL/L (ref 98–112)
CO2 SERPL-SCNC: 22 MMOL/L (ref 21–32)
CREAT BLD-MCNC: 0.74 MG/DL
EOSINOPHIL # BLD AUTO: 0 X10(3) UL (ref 0–0.7)
EOSINOPHIL NFR BLD AUTO: 0 %
ERYTHROCYTE [DISTWIDTH] IN BLOOD BY AUTOMATED COUNT: 12.7 %
GFR SERPLBLD BASED ON 1.73 SQ M-ARVRAT: 109 ML/MIN/1.73M2 (ref 60–?)
GLOBULIN PLAS-MCNC: 3.8 G/DL (ref 2.8–4.4)
GLUCOSE BLD-MCNC: 95 MG/DL (ref 70–99)
HCT VFR BLD AUTO: 37.6 %
HGB BLD-MCNC: 12.8 G/DL
IMM GRANULOCYTES # BLD AUTO: 0.01 X10(3) UL (ref 0–1)
IMM GRANULOCYTES NFR BLD: 0.2 %
LYMPHOCYTES # BLD AUTO: 0.48 X10(3) UL (ref 1–4)
LYMPHOCYTES NFR BLD AUTO: 9.3 %
MCH RBC QN AUTO: 30.9 PG (ref 26–34)
MCHC RBC AUTO-ENTMCNC: 34 G/DL (ref 31–37)
MCV RBC AUTO: 90.8 FL
MONOCYTES # BLD AUTO: 0.35 X10(3) UL (ref 0.1–1)
MONOCYTES NFR BLD AUTO: 6.8 %
NEUTROPHILS # BLD AUTO: 4.28 X10 (3) UL (ref 1.5–7.7)
NEUTROPHILS # BLD AUTO: 4.28 X10(3) UL (ref 1.5–7.7)
NEUTROPHILS NFR BLD AUTO: 83.3 %
OSMOLALITY SERPL CALC.SUM OF ELEC: 289 MOSM/KG (ref 275–295)
PLATELET # BLD AUTO: 247 10(3)UL (ref 150–450)
POTASSIUM SERPL-SCNC: 3.5 MMOL/L (ref 3.5–5.1)
PROT SERPL-MCNC: 8 G/DL (ref 6.4–8.2)
RBC # BLD AUTO: 4.14 X10(6)UL
SODIUM SERPL-SCNC: 139 MMOL/L (ref 136–145)
WBC # BLD AUTO: 5.1 X10(3) UL (ref 4–11)

## 2022-10-11 PROCEDURE — 85025 COMPLETE CBC W/AUTO DIFF WBC: CPT

## 2022-10-11 PROCEDURE — 96361 HYDRATE IV INFUSION ADD-ON: CPT

## 2022-10-11 PROCEDURE — 96374 THER/PROPH/DIAG INJ IV PUSH: CPT

## 2022-10-11 PROCEDURE — 99284 EMERGENCY DEPT VISIT MOD MDM: CPT

## 2022-10-11 PROCEDURE — 80053 COMPREHEN METABOLIC PANEL: CPT

## 2022-10-11 PROCEDURE — 85025 COMPLETE CBC W/AUTO DIFF WBC: CPT | Performed by: EMERGENCY MEDICINE

## 2022-10-11 PROCEDURE — 80053 COMPREHEN METABOLIC PANEL: CPT | Performed by: EMERGENCY MEDICINE

## 2022-10-11 RX ORDER — ONDANSETRON 4 MG/1
4 TABLET, ORALLY DISINTEGRATING ORAL EVERY 4 HOURS PRN
Qty: 10 TABLET | Refills: 0 | Status: SHIPPED | OUTPATIENT
Start: 2022-10-11 | End: 2022-10-18

## 2022-10-11 RX ORDER — ONDANSETRON 2 MG/ML
4 INJECTION INTRAMUSCULAR; INTRAVENOUS ONCE
Status: COMPLETED | OUTPATIENT
Start: 2022-10-11 | End: 2022-10-11

## 2022-10-11 NOTE — ED INITIAL ASSESSMENT (HPI)
Patient c/o V/D with mid abdominal pain X 24 hours, patient thinks that she is dehydrated. Her children had similar symptoms recently.

## 2024-11-08 NOTE — TELEPHONE ENCOUNTER
Left detailed message per patient's request. Detail Level: Detailed Detail Level: Generalized Detail Level: Zone Detail Level: Simple

## 2025-02-27 RX ORDER — MAGNESIUM 200 MG
1 TABLET ORAL DAILY
COMMUNITY

## 2025-02-27 RX ORDER — MULTIVIT-MIN/IRON FUM/FOLIC AC 7.5 MG-4
1 TABLET ORAL DAILY
COMMUNITY

## 2025-02-28 ENCOUNTER — LABORATORY ENCOUNTER (OUTPATIENT)
Dept: LAB | Facility: HOSPITAL | Age: 35
End: 2025-02-28
Attending: OBSTETRICS & GYNECOLOGY
Payer: MEDICARE

## 2025-02-28 DIAGNOSIS — O02.1 MISSED AB (HCC): ICD-10-CM

## 2025-02-28 DIAGNOSIS — Z01.818 PRE-OP TESTING: ICD-10-CM

## 2025-02-28 LAB
ERYTHROCYTE [DISTWIDTH] IN BLOOD BY AUTOMATED COUNT: 12.6 %
HCT VFR BLD AUTO: 35.9 %
HGB BLD-MCNC: 12.4 G/DL
MCH RBC QN AUTO: 31.2 PG (ref 26–34)
MCHC RBC AUTO-ENTMCNC: 34.5 G/DL (ref 31–37)
MCV RBC AUTO: 90.4 FL
PLATELET # BLD AUTO: 257 10(3)UL (ref 150–450)
RBC # BLD AUTO: 3.97 X10(6)UL
WBC # BLD AUTO: 5.7 X10(3) UL (ref 4–11)

## 2025-02-28 PROCEDURE — 85027 COMPLETE CBC AUTOMATED: CPT

## 2025-02-28 PROCEDURE — 36415 COLL VENOUS BLD VENIPUNCTURE: CPT

## 2025-03-04 ENCOUNTER — ANESTHESIA (OUTPATIENT)
Dept: SURGERY | Facility: HOSPITAL | Age: 35
End: 2025-03-04
Payer: MEDICARE

## 2025-03-04 ENCOUNTER — HOSPITAL ENCOUNTER (OUTPATIENT)
Facility: HOSPITAL | Age: 35
Setting detail: HOSPITAL OUTPATIENT SURGERY
Discharge: HOME | End: 2025-03-04
Attending: OBSTETRICS & GYNECOLOGY | Admitting: OBSTETRICS & GYNECOLOGY
Payer: MEDICARE

## 2025-03-04 ENCOUNTER — ANESTHESIA EVENT (OUTPATIENT)
Dept: SURGERY | Facility: HOSPITAL | Age: 35
End: 2025-03-04
Payer: MEDICARE

## 2025-03-04 VITALS
HEART RATE: 63 BPM | DIASTOLIC BLOOD PRESSURE: 59 MMHG | RESPIRATION RATE: 18 BRPM | WEIGHT: 122 LBS | TEMPERATURE: 98 F | OXYGEN SATURATION: 100 % | HEIGHT: 60 IN | BODY MASS INDEX: 23.95 KG/M2 | SYSTOLIC BLOOD PRESSURE: 104 MMHG

## 2025-03-04 DIAGNOSIS — Z01.818 PRE-OP TESTING: ICD-10-CM

## 2025-03-04 DIAGNOSIS — O02.1 MISSED AB (HCC): Primary | ICD-10-CM

## 2025-03-04 PROCEDURE — 10D17ZZ EXTRACTION OF PRODUCTS OF CONCEPTION, RETAINED, VIA NATURAL OR ARTIFICIAL OPENING: ICD-10-PCS | Performed by: OBSTETRICS & GYNECOLOGY

## 2025-03-04 PROCEDURE — 88305 TISSUE EXAM BY PATHOLOGIST: CPT | Performed by: OBSTETRICS & GYNECOLOGY

## 2025-03-04 RX ORDER — ONDANSETRON 2 MG/ML
INJECTION INTRAMUSCULAR; INTRAVENOUS AS NEEDED
Status: DISCONTINUED | OUTPATIENT
Start: 2025-03-04 | End: 2025-03-04 | Stop reason: SURG

## 2025-03-04 RX ORDER — HYDROMORPHONE HYDROCHLORIDE 1 MG/ML
0.2 INJECTION, SOLUTION INTRAMUSCULAR; INTRAVENOUS; SUBCUTANEOUS EVERY 5 MIN PRN
Status: DISCONTINUED | OUTPATIENT
Start: 2025-03-04 | End: 2025-03-04

## 2025-03-04 RX ORDER — ACETAMINOPHEN 500 MG
1000 TABLET ORAL ONCE AS NEEDED
Status: DISCONTINUED | OUTPATIENT
Start: 2025-03-04 | End: 2025-03-04

## 2025-03-04 RX ORDER — MEPERIDINE HYDROCHLORIDE 25 MG/ML
12.5 INJECTION INTRAMUSCULAR; INTRAVENOUS; SUBCUTANEOUS AS NEEDED
Status: DISCONTINUED | OUTPATIENT
Start: 2025-03-04 | End: 2025-03-04

## 2025-03-04 RX ORDER — OXYCODONE HYDROCHLORIDE 5 MG/1
TABLET ORAL
Status: DISCONTINUED
Start: 2025-03-04 | End: 2025-03-04

## 2025-03-04 RX ORDER — OXYCODONE HYDROCHLORIDE 5 MG/1
5 TABLET ORAL ONCE AS NEEDED
Status: COMPLETED | OUTPATIENT
Start: 2025-03-04 | End: 2025-03-04

## 2025-03-04 RX ORDER — ACETAMINOPHEN 500 MG
1000 TABLET ORAL ONCE
Status: DISCONTINUED | OUTPATIENT
Start: 2025-03-04 | End: 2025-03-04 | Stop reason: HOSPADM

## 2025-03-04 RX ORDER — ONDANSETRON 2 MG/ML
4 INJECTION INTRAMUSCULAR; INTRAVENOUS EVERY 6 HOURS PRN
Status: DISCONTINUED | OUTPATIENT
Start: 2025-03-04 | End: 2025-03-04

## 2025-03-04 RX ORDER — LIDOCAINE HYDROCHLORIDE 10 MG/ML
INJECTION, SOLUTION EPIDURAL; INFILTRATION; INTRACAUDAL; PERINEURAL AS NEEDED
Status: DISCONTINUED | OUTPATIENT
Start: 2025-03-04 | End: 2025-03-04 | Stop reason: SURG

## 2025-03-04 RX ORDER — MIDAZOLAM HYDROCHLORIDE 1 MG/ML
INJECTION INTRAMUSCULAR; INTRAVENOUS AS NEEDED
Status: DISCONTINUED | OUTPATIENT
Start: 2025-03-04 | End: 2025-03-04 | Stop reason: SURG

## 2025-03-04 RX ORDER — PROCHLORPERAZINE EDISYLATE 5 MG/ML
5 INJECTION INTRAMUSCULAR; INTRAVENOUS EVERY 8 HOURS PRN
Status: DISCONTINUED | OUTPATIENT
Start: 2025-03-04 | End: 2025-03-04

## 2025-03-04 RX ORDER — HYDROMORPHONE HYDROCHLORIDE 1 MG/ML
0.6 INJECTION, SOLUTION INTRAMUSCULAR; INTRAVENOUS; SUBCUTANEOUS EVERY 5 MIN PRN
Status: DISCONTINUED | OUTPATIENT
Start: 2025-03-04 | End: 2025-03-04

## 2025-03-04 RX ORDER — NALOXONE HYDROCHLORIDE 0.4 MG/ML
0.08 INJECTION, SOLUTION INTRAMUSCULAR; INTRAVENOUS; SUBCUTANEOUS AS NEEDED
Status: DISCONTINUED | OUTPATIENT
Start: 2025-03-04 | End: 2025-03-04

## 2025-03-04 RX ORDER — SCOPOLAMINE 1 MG/3D
1 PATCH, EXTENDED RELEASE TRANSDERMAL ONCE
Status: DISCONTINUED | OUTPATIENT
Start: 2025-03-04 | End: 2025-03-04 | Stop reason: HOSPADM

## 2025-03-04 RX ORDER — DEXAMETHASONE SODIUM PHOSPHATE 4 MG/ML
VIAL (ML) INJECTION AS NEEDED
Status: DISCONTINUED | OUTPATIENT
Start: 2025-03-04 | End: 2025-03-04 | Stop reason: SURG

## 2025-03-04 RX ORDER — SODIUM CHLORIDE, SODIUM LACTATE, POTASSIUM CHLORIDE, CALCIUM CHLORIDE 600; 310; 30; 20 MG/100ML; MG/100ML; MG/100ML; MG/100ML
INJECTION, SOLUTION INTRAVENOUS CONTINUOUS
Status: DISCONTINUED | OUTPATIENT
Start: 2025-03-04 | End: 2025-03-04

## 2025-03-04 RX ORDER — HYDROMORPHONE HYDROCHLORIDE 1 MG/ML
0.4 INJECTION, SOLUTION INTRAMUSCULAR; INTRAVENOUS; SUBCUTANEOUS EVERY 5 MIN PRN
Status: DISCONTINUED | OUTPATIENT
Start: 2025-03-04 | End: 2025-03-04

## 2025-03-04 RX ORDER — KETOROLAC TROMETHAMINE 30 MG/ML
INJECTION, SOLUTION INTRAMUSCULAR; INTRAVENOUS AS NEEDED
Status: DISCONTINUED | OUTPATIENT
Start: 2025-03-04 | End: 2025-03-04 | Stop reason: SURG

## 2025-03-04 RX ADMIN — ONDANSETRON 4 MG: 2 INJECTION INTRAMUSCULAR; INTRAVENOUS at 14:23:00

## 2025-03-04 RX ADMIN — SODIUM CHLORIDE, SODIUM LACTATE, POTASSIUM CHLORIDE, CALCIUM CHLORIDE: 600; 310; 30; 20 INJECTION, SOLUTION INTRAVENOUS at 14:12:00

## 2025-03-04 RX ADMIN — KETOROLAC TROMETHAMINE 25 MG: 30 INJECTION, SOLUTION INTRAMUSCULAR; INTRAVENOUS at 14:32:00

## 2025-03-04 RX ADMIN — DEXAMETHASONE SODIUM PHOSPHATE 4 MG: 4 MG/ML VIAL (ML) INJECTION at 14:23:00

## 2025-03-04 RX ADMIN — MIDAZOLAM HYDROCHLORIDE 2 MG: 1 INJECTION INTRAMUSCULAR; INTRAVENOUS at 14:15:00

## 2025-03-04 RX ADMIN — LIDOCAINE HYDROCHLORIDE 50 MG: 10 INJECTION, SOLUTION EPIDURAL; INFILTRATION; INTRACAUDAL; PERINEURAL at 14:18:00

## 2025-03-04 NOTE — OPERATIVE REPORT
Carmen Grossman Patient Status:  [facility] Outpatient Surgery    9/15/1990 MRN [id number]   [facility] SURGERY Attending Chuy Álvarez MD   Hosp Day # 0 PCP None Pcp     Procedure date 3/4/25    OPERATIVE REPORT      PREOPERATIVE DIAGNOSIS:  Missed .  POSTOPERATIVE DIAGNOSIS:  Missed .  PROCEDURE PERFORMED:  Suction dilation and curettage  SURGEON: BETITO Álvarez M.D.      ANESTHESIA:  IV sedation.    FINDINGS:  Cervix was closed.  The uterus was anteverted and approximately 8 weeks' size.  A moderate amount of tissue was curetted from the endometrium and sent to Pathology for analysis.    TECHNIQUE:  The patient was prepped and draped in the sterile fashion after satisfactory IV sedation was given.  The patient was examined and the bladder was catheterized.  A weighted speculum was placed in the posterior vaginal vault and the anterior lip of the cervix was grasped with a ring forceps.  The cervix was dilated to 7 mm.  A #7 curved plastic suction and curette was used to curette the endometrial cavity.  A moderate amount of tissue was removed.  Gentle sharp curettage confirmed that the cavity had been emptied.  The suction curette was passed once again for hemostasis.  Bleeding from the cervix was minimal.  The instruments were then removed and the patient returned to the supine position.  She tolerated the procedure well with a 10 mL blood loss and went to recovery in good condition.    Chuy Álvarez MD  25

## 2025-03-04 NOTE — ANESTHESIA PREPROCEDURE EVALUATION
PRE-OP EVALUATION    Patient Name: Carmen Grossman    Admit Diagnosis: MISSED AB    Pre-op Diagnosis: MISSED AB    SUCTION DILATION AND CURETTAGE    Anesthesia Procedure: SUCTION DILATION AND CURETTAGE    Surgeons and Role:     * Chuy Álvarez MD - Primary    Pre-op vitals reviewed.  Temp: 98 °F (36.7 °C)  Pulse: 67  Resp: 16  BP: 114/70  SpO2: 100 %  Body mass index is 23.83 kg/m².    Current medications reviewed.  Hospital Medications:   [Transfer Hold] acetaminophen (Tylenol Extra Strength) tab 1,000 mg  1,000 mg Oral Once    [Transfer Hold] scopolamine (Transderm-Scop) 1 MG/3DAYS patch 1 patch  1 patch Transdermal Once    lactated ringers infusion   Intravenous Continuous    doxycycline hyclate (Vibramycin) 200 mg in sodium chloride 0.9% 250 mL IVPB  200 mg Intravenous Once       Outpatient Medications:   Prescriptions Prior to Admission[1]    Allergies: Patient has no known allergies.      Anesthesia Evaluation        Anesthetic Complications  (-) history of anesthetic complications         GI/Hepatic/Renal    Negative GI/hepatic/renal ROS.                             Cardiovascular    Negative cardiovascular ROS.    Exercise tolerance: good     MET: >4                                           Endo/Other                                  Pulmonary    Negative pulmonary ROS.                       Neuro/Psych                 (+) neuromuscular disease                     Past Surgical History:   Procedure Laterality Date    Colposcopy, cervix w/upper adjacent vagina; w/endocervical curettage  7-21-11    CIN1    Oral surgery procedure  03/14    wisdom teeth extraction    Other surgical history  7/2013    hernia repair     Social History     Socioeconomic History    Marital status: Single    Number of children: 0   Occupational History    Occupation: [employer]     Comment: [employer]   Tobacco Use    Smoking status: Never    Smokeless tobacco: Never   Vaping Use    Vaping status: Never Used    Substance and Sexual Activity    Alcohol use: No    Drug use: Not Currently     Comment: cbd products for TM    Sexual activity: Yes     Partners: Male     Comment: boyfriend   Other Topics Concern    Caffeine Concern No    Exercise No     History   Drug Use Unknown     Comment: cbd products for TM     Available pre-op labs reviewed.  Lab Results   Component Value Date    WBC 5.7 02/28/2025    RBC 3.97 02/28/2025    HGB 12.4 02/28/2025    HCT 35.9 02/28/2025    MCV 90.4 02/28/2025    MCH 31.2 02/28/2025    MCHC 34.5 02/28/2025    RDW 12.6 02/28/2025    .0 02/28/2025               Airway      Mallampati: II  Mouth opening: >3 FB  TM distance: > 6 cm  Neck ROM: full Cardiovascular    Cardiovascular exam normal.         Dental    Dentition appears grossly intact         Pulmonary    Pulmonary exam normal.                 Other findings              ASA: 1   Plan: MAC  NPO status verified and patient meets guidelines.    Post-procedure pain management plan discussed with surgeon and patient.    Comment: Plan is MAC anesthesia, which likely will include deep sedation.  Implied that memory of procedure is unlikely although intraop recall, if it occurs, may be a reasonable and comfortable experience with this anesthetic.  Aware that general anesthesia is not intended though deep sedation may include brief moments of general anesthesia.      Plan/risks discussed with: patient                Present on Admission:  **None**             [1]   Medications Prior to Admission   Medication Sig Dispense Refill Last Dose/Taking    gabapentin 300 MG Oral Cap Take 1 capsule (300 mg total) by mouth 2 (two) times daily.   2/14/2025    Magnesium 200 MG Oral Tab Take 1 tablet (200 mg total) by mouth daily.   Unknown    Multiple Vitamins-Minerals (MULTI-VITAMIN/MINERALS) Oral Tab Take 1 tablet by mouth daily.   Unknown

## 2025-03-04 NOTE — ANESTHESIA POSTPROCEDURE EVALUATION
[facility]    Carmen Grossman Patient Status:  Hospital Outpatient Surgery   Age/Gender 34 year old female MRN [id number]   Location [facility] PERIOPERATIVE SERVICE Attending Chuy Álvarez MD   Hosp Day # 0 PCP None Pcp       Anesthesia Post-op Note    SUCTION DILATION AND CURETTAGE    Procedure Summary       Date: 03/04/25 Room / Location:  MAIN OR 02 / [facility] MAIN OR    Anesthesia Start: 1412 Anesthesia Stop: 1445    Procedure: SUCTION DILATION AND CURETTAGE Diagnosis: (MISSED AB)    Surgeons: Chuy Álvarez MD Anesthesiologist: Chantal Pedroza DO    Anesthesia Type: MAC ASA Status: 1            Anesthesia Type: MAC    Vitals Value Taken Time   /64 03/04/25 1446   Temp 97.5 03/04/25 1450   Pulse 70 03/04/25 1449   Resp  03/04/25 1450   SpO2 98 % 03/04/25 1449   Vitals shown include unfiled device data.        Patient Location: Same Day Surgery    Anesthesia Type: MAC    Airway Patency: patent    Postop Pain Control: adequate    Mental Status: preanesthetic baseline    Nausea/Vomiting: none    Cardiopulmonary/Hydration status: stable euvolemic    Complications: no apparent anesthesia related complications    Postop vital signs: stable    Dental Exam: Unchanged from Preop    Patient to be discharged home when criteria met.

## 2025-03-04 NOTE — H&P
HPI:  Pt here for suction D&C due to missed   SIGNIFICANT HISTORY:  HISTORY:  Past Medical History:    ASCUS on Pap smear    Blind    legally blind    ARASH I (cervical intraepithelial neoplasia I)    COLPO    HEADACHES    HPV (human papillomavirus)    Human papilloma virus infection    Pap smear for cervical cancer screening    wnl pt stated    Sexually transmitted disease    Visual impairment    glasses      Past Surgical History:   Procedure Laterality Date    Colposcopy, cervix w/upper adjacent vagina; w/endocervical curettage  11    CIN1    Oral surgery procedure      wisdom teeth extraction    Other surgical history  2013    hernia repair      Family History   Problem Relation Age of Onset    Diabetes Brother     Breast Cancer Mother     Other (osteoporosis) Maternal Grandmother       Social History     Socioeconomic History    Marital status: Single    Number of children: 0   Occupational History    Occupation: [employer]     Comment: [employer]   Tobacco Use    Smoking status: Never    Smokeless tobacco: Never   Vaping Use    Vaping status: Never Used   Substance and Sexual Activity    Alcohol use: No    Drug use: Not Currently     Comment: cbd products for TM    Sexual activity: Yes     Partners: Male     Comment: boyfriend   Other Topics Concern    Caffeine Concern No    Exercise No        Past Surgical History:   Procedure Laterality Date    Colposcopy, cervix w/upper adjacent vagina; w/endocervical curettage  11    CIN1    Oral surgery procedure      wisdom teeth extraction    Other surgical history  2013    hernia repair     OB History    Para Term  AB Living   6 3 3 0 2 3   SAB IAB Ectopic Multiple Live Births   0 0 0 0 3     Social History     Socioeconomic History    Marital status: Single    Number of children: 0   Occupational History    Occupation: [employer]     Comment: [employer]   Tobacco Use    Smoking status: Never    Smokeless  tobacco: Never   Vaping Use    Vaping status: Never Used   Substance and Sexual Activity    Alcohol use: No    Drug use: Not Currently     Comment: cbd products for TM    Sexual activity: Yes     Partners: Male     Comment: boyfriend   Other Topics Concern    Caffeine Concern No    Exercise No         ROS:  GENERAL HEALTH: feels well otherwise  GI: denies nausea, vomiting, constipation, diarrhea; no rectal bleeding; no heartburn  GYNE/: no dysuria, urgency or frequency; no hx abnormal pap smear; no vaginal discharge; no dyspareunia; periods regular; no urinary incontinence  MUSCULOSKELETAL: no joint complaints upper or lower extremities  PSYCHE: no symptoms of depression or anxiety  HEMATOLOGY: denies hx anemia; denies bruising or excessive bleeding  ENDOCRINE:  denies unexpected wt gain or wt loss  ALLERGY/IMM.: denies food or seasonal allergies    PHYSICAL EXAM:  GENERAL: well developed, well nourished, in no apparent distress  SKIN: no rashes, no suspicious lesions  HEENT: normal  LUNGS: clear to auscultation  CARDIOVASCULAR: normal S1, S2, RRR  ABDOMEN: Soft, non distended; non tender, no masses  GYNE/: External Genitalia: Normal                      Vagina: normal                      Uterus: av, mobile, non tender, 8 week size                     Cervix: pink and clear, no lesions                     Adnexa: non tender, no masses  EXTREMITIES:  non tender without edema    Imaging:  U/s last week. Missed  6w1d    ASSESMENT/PLAN:  1. Surgery to be performed:suction D&C  2. Indication:missed   3. The procedure, its risks, benefits, possible complications and alternatives discussed with the patient.  She understands and agrees to the procedure.      Id#[id number]

## 2025-03-04 NOTE — DISCHARGE INSTRUCTIONS
Dilatation and Curettage      Holger Jack, Henri, Jacob Stewart Sayla, Schreiber, Deon, Guillermo, Weeks    After surgery you may have some light vaginal bleeding. This may last up to a week and is not a concern unless it is heavier than a menstrual period. If you had an endometrial ablation, you can expect a watery discharge which may last for up to 4-6 weeks.    You should avoid tampons for the first week after surgery. Also no intercourse for one week after a D and C and two weeks after an ablation.    You may experience cramping the day/evening of surgery. This is usually relieved with over the counter Acetaminophen (Tylenol), Ibuprofen (Motrin or Advil) or Naprosyn (Aleve). A prescription pain medicine may be ordered by your physician. You may also be given a medication for possible nausea.    You should rest the day of surgery. No driving for 24 hours after general anesthesia or sedation or if you are taking prescription pain medications. You may resume normal activities the next day. Showering is fine the day after surgery. Exercise is fine the next day if you are comfortable doing it.    You may resume your normal diet once your appetite returns after surgery.  Some patients will experience nausea from anesthesia or narcotics: we recommend you start with a light diet. Do not drink alcohol or use other sedating medications (sleep aids, sedating cold medications) if taking prescription pain medications. Resume your normal medications as instructed.    Please call our office if you experience any of the following symptoms:  Temperature over 100.5 degrees  Vaginal bleeding heavier than a moderate period  Severe abdominal/pelvic pain  Shortness of breath or chest pain  New onset of leg pain and/or swelling    If a specimen was sent to pathology, you can expect a call from your doctor within 10 days with the report.   If your doctor requested a post op appointment, please call to schedule an  appointment for 2 weeks post op.    Please call with any other questions or concerns.    Office Number: [phone]

## 2025-03-08 ENCOUNTER — TELEPHONE (OUTPATIENT)
Dept: OBGYN UNIT | Facility: HOSPITAL | Age: 35
End: 2025-03-08

## (undated) DEVICE — GLOVE SUR 7.5 SENSICARE PI PIP CRM PWD F

## (undated) DEVICE — SYSTEM COLL W/ TISS TRAP INCLUDE COLL CANSTR

## (undated) DEVICE — CATHETER,URETHRAL,REDRUBBER,STRL,16FR: Brand: MEDLINE

## (undated) DEVICE — PACK GYNE CUSTOM

## (undated) DEVICE — Device

## (undated) DEVICE — PREMIUM WET SKIN PREP TRAY: Brand: MEDLINE INDUSTRIES, INC.

## (undated) DEVICE — SLEEVE COMPR MD KNEE LEN SGL USE KENDALL SCD

## (undated) DEVICE — HOSE CONN 18IN SER FOR BERK SAFETOUCH COLL

## (undated) DEVICE — SOLUTION IRRIG 1000ML 0.9% NACL USP BTL

## (undated) NOTE — ED AVS SNAPSHOT
[facility] Emergency Department    [place] [address]    Phone:  [phone]    Fax:  [phone]           Ms. Francisco De Souza   MRN: [id number]    Department:  [facility] Emergency Department   Date of Visit from our patient liason soon after your visit. Also, some patients receive a detailed feedback survey mailed to them a week after the visit. If you receive this, we would really appreciate it if you could take the time to complete it. Thank you!       [facility] ([address]) [facility] [address] [facility]. ([address]) [facility] [address] ([place]) [address] [employer] [address]  [address] ([address]

## (undated) NOTE — MR AVS SNAPSHOT
[address], [facility] [address] [address]  [phone]               Thank you for choosing us for your health care visit with Diane Gilliland MD.  We are glad to serve you and happy to provide you with this sum Eat plenty of low-fat dairy products High fat meats and dairy   Choose whole grain products Foods high in sodium   Water is best for hydration Fast food.    Eat at home when possible     Tips for increasing your physical activity – Adults who are physically

## (undated) NOTE — MR AVS SNAPSHOT
After Visit Summary   1/9/2017    Marti Garvin    MRN: [id number]           Visit Information        Provider Department Dept Phone    1/9/2017  2:30 PM Pamela Arthur MD [facility] [phone]      Your Vitals Were     BP Pulse Ht Wt BM Provider Department    2/6/2017 12:45 PM Chichi Youssef [facility], [address] [facility]        January 10, 2017        Diannia Fee     [address] [facility]  [address] [facility] [address]       Dear Audrey Dhillon :      Thank you for enrolling in M patients. Video Visits are available Monday - Friday for many common conditions such as allergies, colds, cough, fever, rash, sore throat, headache and pink eye.   The cost for a Video Visit is currently $10.         If you receive a survey from [employer]

## (undated) NOTE — MR AVS SNAPSHOT
[address], [facility] [address] [address] [phone]               Thank you for choosing us for your health care visit with Amilcar Maloney MD.  We are glad to serve you and happy to provide you with this summar

## (undated) NOTE — ED AVS SNAPSHOT
[facility] Emergency Department    [place] [address] [place] [address]    Phone:  [phone]    Fax:  [phone]           MsDenisha Xiong Officer   MRN: [id number]    Department:  [facility] Emergency Department   Date of Visit IF THERE IS ANY CHANGE OR WORSENING OF YOUR CONDITION, CALL YOUR PRIMARY CARE PHYSICIAN AT ONCE OR RETURN IMMEDIATELY TO THE EMERGENCY DEPARTMENT.     If you have been prescribed any medication(s), please fill your prescription right away and begin taking t

## (undated) NOTE — MR AVS SNAPSHOT
[facility], [facility]  [address] [facility], [facility] [address] [address]  [phone]               Thank you for choosing us for your health care visit with MITESH Flores.   We are glad to serve you and happy to provide you with this sum

## (undated) NOTE — MR AVS SNAPSHOT
[address], [facility] [address] [id number] [phone]               Thank you for choosing us for your health care visit with Felicity Crowder MD.  We are glad to serve you and happy to provide you with this summar Rubella, IGG    Complete by:  Jan 09, 2017 (Approximate)        HIV AG AB COMBO    Complete by:  Jan 09, 2017 (Approximate)        Urine Culture, Routine    Complete by:  Jan 09, 2017 (Approximate)        ThinPrep PAP with HPV Reflex Request    Complete b

## (undated) NOTE — MR AVS SNAPSHOT
[facility], [facility]  [address] [facility], [facility] [address] [address]  [phone]               Thank you for choosing us for your health care visit with MITESH Bowman.   We are glad to serve you and happy to provide you with this sum [email or web address]

## (undated) NOTE — MR AVS SNAPSHOT
[address] [facility] [address], [place] [address] [facility]  [phone]               Thank you for choosing us for your health care visit with Curt Farmer MD.  We are glad to serve you and happy to provide you with this or pregnancy induced glucose intolerance (PIGI). This condition disappears after delivery. It is known to occur in approximately 5-10% of all pregnancies.   It is important because we know that increased maternal blood glucose (sugar) levels can cause fet If you have any questions regarding this testing, please feel free to contact us. If gestational diabetes is detected, it will be thoroughly explained to you and discussion on management will take place with one of our physicians.     *If your preferred [place]

## (undated) NOTE — MR AVS SNAPSHOT
[facility], [facility]  [address] [facility] [address], [place] [address] [address] [phone]               Thank you for choosing us for your health care visit with MITESH Recinos.   We are glad to serve you and happy to provide you with this sum

## (undated) NOTE — MR AVS SNAPSHOT
[facility] [address], [facility] [address] [facility]  [phone]               Thank you for choosing us for your health care visit with Raine Garcia MD.  We are glad to serve you and happy to provide you with t which can lead to breathing problems, paralysis, heart failure, and death. · Pertussis (Whooping Cough) causes severe coughing spells, which can cause difficulty breathing, vomiting, and disturbed sleep.   It can also lead to weight loss, incontinence, and US OB FOLLOW UP SPECIFIC CONDITION PER FETUS EMG ONLY             MyChart                  Visit [facility] online at  [email or web address]